# Patient Record
Sex: MALE | Employment: FULL TIME | URBAN - METROPOLITAN AREA
[De-identification: names, ages, dates, MRNs, and addresses within clinical notes are randomized per-mention and may not be internally consistent; named-entity substitution may affect disease eponyms.]

---

## 2018-08-20 ENCOUNTER — HOSPITAL ENCOUNTER (EMERGENCY)
Age: 63
Discharge: HOME | End: 2018-08-20
Attending: EMERGENCY MEDICINE
Payer: MEDICARE

## 2018-08-20 ENCOUNTER — APPOINTMENT (OUTPATIENT)
Dept: GENERAL RADIOLOGY | Age: 63
End: 2018-08-20
Attending: EMERGENCY MEDICINE
Payer: MEDICARE

## 2018-08-20 VITALS
RESPIRATION RATE: 15 BRPM | HEIGHT: 72 IN | WEIGHT: 140 LBS | DIASTOLIC BLOOD PRESSURE: 76 MMHG | HEART RATE: 72 BPM | SYSTOLIC BLOOD PRESSURE: 135 MMHG | BODY MASS INDEX: 18.96 KG/M2 | TEMPERATURE: 98.2 F | OXYGEN SATURATION: 99 %

## 2018-08-20 DIAGNOSIS — R00.2 PALPITATIONS: ICD-10-CM

## 2018-08-20 DIAGNOSIS — N18.5 STAGE 5 CHRONIC KIDNEY DISEASE NOT ON CHRONIC DIALYSIS (HCC): ICD-10-CM

## 2018-08-20 DIAGNOSIS — I47.1 PAROXYSMAL SVT (SUPRAVENTRICULAR TACHYCARDIA) (HCC): Primary | ICD-10-CM

## 2018-08-20 LAB
ALBUMIN SERPL-MCNC: 3.5 G/DL (ref 3.4–5)
ALBUMIN/GLOB SERPL: 0.8 {RATIO} (ref 0.8–1.7)
ALP SERPL-CCNC: 87 U/L (ref 45–117)
ALT SERPL-CCNC: 24 U/L (ref 16–61)
ANION GAP SERPL CALC-SCNC: 10 MMOL/L (ref 3–18)
AST SERPL-CCNC: 11 U/L (ref 15–37)
BASOPHILS # BLD: 0 K/UL (ref 0–0.1)
BASOPHILS NFR BLD: 1 % (ref 0–2)
BILIRUB SERPL-MCNC: 0.3 MG/DL (ref 0.2–1)
BUN SERPL-MCNC: 49 MG/DL (ref 7–18)
BUN/CREAT SERPL: 9 (ref 12–20)
CALCIUM SERPL-MCNC: 7.6 MG/DL (ref 8.5–10.1)
CHLORIDE SERPL-SCNC: 119 MMOL/L (ref 100–108)
CK MB CFR SERPL CALC: 1.9 % (ref 0–4)
CK MB SERPL-MCNC: 2.4 NG/ML (ref 5–25)
CK SERPL-CCNC: 124 U/L (ref 39–308)
CO2 SERPL-SCNC: 16 MMOL/L (ref 21–32)
CREAT SERPL-MCNC: 5.7 MG/DL (ref 0.6–1.3)
DIFFERENTIAL METHOD BLD: ABNORMAL
EOSINOPHIL # BLD: 0.2 K/UL (ref 0–0.4)
EOSINOPHIL NFR BLD: 4 % (ref 0–5)
ERYTHROCYTE [DISTWIDTH] IN BLOOD BY AUTOMATED COUNT: 12.9 % (ref 11.6–14.5)
GLOBULIN SER CALC-MCNC: 4.5 G/DL (ref 2–4)
GLUCOSE SERPL-MCNC: 120 MG/DL (ref 74–99)
HCT VFR BLD AUTO: 33.7 % (ref 36–48)
HGB BLD-MCNC: 11.6 G/DL (ref 13–16)
LYMPHOCYTES # BLD: 1.3 K/UL (ref 0.9–3.6)
LYMPHOCYTES NFR BLD: 30 % (ref 21–52)
MCH RBC QN AUTO: 28.7 PG (ref 24–34)
MCHC RBC AUTO-ENTMCNC: 34.4 G/DL (ref 31–37)
MCV RBC AUTO: 83.4 FL (ref 74–97)
MONOCYTES # BLD: 0.4 K/UL (ref 0.05–1.2)
MONOCYTES NFR BLD: 10 % (ref 3–10)
NEUTS SEG # BLD: 2.4 K/UL (ref 1.8–8)
NEUTS SEG NFR BLD: 55 % (ref 40–73)
PLATELET # BLD AUTO: 157 K/UL (ref 135–420)
PMV BLD AUTO: 9 FL (ref 9.2–11.8)
POTASSIUM SERPL-SCNC: 4.2 MMOL/L (ref 3.5–5.5)
PROT SERPL-MCNC: 8 G/DL (ref 6.4–8.2)
RBC # BLD AUTO: 4.04 M/UL (ref 4.7–5.5)
SODIUM SERPL-SCNC: 145 MMOL/L (ref 136–145)
TROPONIN I SERPL-MCNC: 0.05 NG/ML (ref 0–0.04)
WBC # BLD AUTO: 4.3 K/UL (ref 4.6–13.2)

## 2018-08-20 PROCEDURE — 93005 ELECTROCARDIOGRAM TRACING: CPT

## 2018-08-20 PROCEDURE — 85025 COMPLETE CBC W/AUTO DIFF WBC: CPT | Performed by: EMERGENCY MEDICINE

## 2018-08-20 PROCEDURE — 80053 COMPREHEN METABOLIC PANEL: CPT | Performed by: EMERGENCY MEDICINE

## 2018-08-20 PROCEDURE — 99285 EMERGENCY DEPT VISIT HI MDM: CPT

## 2018-08-20 PROCEDURE — 71045 X-RAY EXAM CHEST 1 VIEW: CPT

## 2018-08-20 PROCEDURE — 82550 ASSAY OF CK (CPK): CPT | Performed by: EMERGENCY MEDICINE

## 2018-08-20 RX ORDER — ADENOSINE 3 MG/ML
6 INJECTION, SOLUTION INTRAVENOUS
Status: DISCONTINUED | OUTPATIENT
Start: 2018-08-20 | End: 2018-08-21 | Stop reason: HOSPADM
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
## 2018-08-21 LAB
ATRIAL RATE: 23 BPM
ATRIAL RATE: 58 BPM
ATRIAL RATE: 78 BPM
CALCULATED P AXIS, ECG09: 66 DEGREES
CALCULATED R AXIS, ECG10: -42 DEGREES
CALCULATED R AXIS, ECG10: -46 DEGREES
CALCULATED R AXIS, ECG10: -55 DEGREES
CALCULATED T AXIS, ECG11: 113 DEGREES
CALCULATED T AXIS, ECG11: 90 DEGREES
CALCULATED T AXIS, ECG11: 91 DEGREES
DIAGNOSIS, 93000: NORMAL
P-R INTERVAL, ECG05: 206 MS
Q-T INTERVAL, ECG07: 340 MS
Q-T INTERVAL, ECG07: 344 MS
Q-T INTERVAL, ECG07: 414 MS
QRS DURATION, ECG06: 134 MS
QRS DURATION, ECG06: 136 MS
QRS DURATION, ECG06: 142 MS
QTC CALCULATION (BEZET), ECG08: 471 MS
QTC CALCULATION (BEZET), ECG08: 515 MS
QTC CALCULATION (BEZET), ECG08: 526 MS
VENTRICULAR RATE, ECG03: 138 BPM
VENTRICULAR RATE, ECG03: 141 BPM
VENTRICULAR RATE, ECG03: 78 BPM

## 2018-08-21 NOTE — DISCHARGE INSTRUCTIONS
Supraventricular Tachycardia: Care Instructions  Your Care Instructions    Having supraventricular tachycardia (SVT) means that from time to time your heart beats abnormally fast. This fast rhythm is caused by changes in the electrical system of your heart. You may feel a fluttering in your chest (palpitations) and have a fast pulse. When your heart is beating fast, you may feel anxious and lightheaded, be short of breath, and feel discomfort in the chest.  Your doctor may prescribe medicines to help slow down your heartbeat. Your doctor may also suggest you try vagal maneuvers when having an episode of SVT. These are things, like bearing down, that might help slow your heart rate. Bearing down means that you try to breathe out with your stomach muscles but you don't let air out of your nose or mouth. Your doctor can show you how to do vagal maneuvers. He or she may suggest you lie down on your back to do them. In some cases, either cardioversion treatment or a procedure called catheter ablation is done to correct SVT. Your doctor may ask you to wear a small electronic device for 1 or 2 days to monitor your heart. It is called a Holter monitor. Follow-up care is a key part of your treatment and safety. Be sure to make and go to all appointments, and call your doctor if you are having problems. It's also a good idea to know your test results and keep a list of the medicines you take. How can you care for yourself at home? · Be safe with medicines. Take your medicines exactly as prescribed. Call your doctor if you think you are having a problem with your medicine. You will get more details on the specific medicines your doctor prescribes. · If your doctor showed you how to do vagal maneuvers, try them when you have an episode. These maneuvers include bearing down or putting an ice-cold, wet towel on your face. · Monitor your condition by keeping a diary of your SVT episodes.  Bring this to your doctor appointments. ¨ Write down how fast or slow your heart was beating. To count your heart rate:  § Gently place 2 fingers of your hand on the inside of your other wrist, below your thumb. § Count the beats for 30 seconds. § Then, double the result to get the number of beats per minute. ¨ Write down if your heart rhythm was regular or irregular. ¨ Write down the symptoms you had. ¨ Write down the time of day your symptoms occurred. ¨ Write down how long your symptoms lasted. ¨ Write down what you were doing when your symptoms started. ¨ Write down what may have helped your symptoms go away. · If they trigger episodes, limit or avoid alcohol or drinks with caffeine. · Do not use over-the-counter decongestants, herbal remedies, diet pills, or \"pep\" pills, which often contain stimulants. · Do not use illegal drugs, such as cocaine, ecstasy, or methamphetamine, which can speed up your heart's rhythm. · Do not smoke. Smoking can make this condition worse. If you need help quitting, talk to your doctor about stop-smoking programs and medicines. These can increase your chances of quitting for good. · Be alert for new or worsening symptoms, such as shortness of breath, pounding of your heart, or unusual tiredness. If new symptoms develop or your symptoms become worse, call your doctor. When should you call for help? Call 911 anytime you think you may need emergency care. For example, call if:    · You passed out (lost consciousness).     · You are short of breath.    Call your doctor now or seek immediate medical care if:    · You have a fast heartbeat.     · You are dizzy or lightheaded, or feel like you may faint.    Watch closely for changes in your health, and be sure to contact your doctor if:    · You do not get better as expected. Where can you learn more? Go to http://karen-catrachito.info/.   Enter G244 in the search box to learn more about \"Supraventricular Tachycardia: Care Instructions. \"  Current as of: December 6, 2017  Content Version: 11.7  © 2629-3336 [employer]. Care instructions adapted under license by [employer] (which disclaims liability or warranty for this information). If you have questions about a medical condition or this instruction, always ask your healthcare professional. [address] any warranty or liability for your use of this information. Learning About Chronic Kidney Disease  What is chronic kidney disease? Chronic kidney disease means your kidneys have not worked right for a while. Your kidneys have an important job. They remove waste and extra fluid from your blood. This waste and fluid goes out of your body in your urine. When your kidneys don't work as they should, wastes build up in your blood. This makes you sick. High blood pressure and diabetes can cause kidney damage. Other causes include kidney infections and some medicines. Chronic kidney disease is also called chronic renal failure. Or it may be called chronic renal insufficiency. How is chronic kidney disease diagnosed? · Your doctor will do blood and urine tests to check your kidney function. This will help your doctor see how well your kidneys filter your blood. · Your doctor will ask you about past kidney problems. He or she will ask if you have a family history of kidney disease. Your doctor will also want to know what medicines you take. This includes prescription and over-the-counter medicines. · You may have a test, such as an ultrasound or CT scan. These tests let your doctor look at a picture of your kidneys. This can help your doctor measure the size of your kidneys and see if anything is blocking your urine flow. · In some cases, your doctor may take a tiny sample of kidney tissue. This is called a biopsy. It helps the doctor find out what caused the kidney disease. What are the symptoms?   You may not have symptoms if your disease is mild. If you lose more kidney function, you may:  · Have swelling and weight gain. This is from the extra fluid in your tissues. It is called edema (say \"ih-CHRISTOS-muh\"). · Often feel sick to your stomach (nauseated) or vomit. · Have trouble sleeping. · Urinate less than normal.  · Have trouble thinking clearly. · Feel very tired. What can you expect when you have chronic kidney disease? · In the early stages of the disease, your kidneys are still able to regulate the fluids, salts, and waste products in your body. But if you keep losing kidney function, you may start to have problems, or complications. · How long it takes for the kidney disease to get worse depends on your condition. Sometimes it gets worse very slowly over many years. Or it may get worse more quickly. · When kidney function falls below a certain point, it is called kidney failure. Kidney failure affects your whole body. It can cause serious heart, bone, and brain problems and make you feel very ill. Untreated kidney failure can cause death. How is it treated? Manage your health problems  · If you have diabetes, it's important to control your blood sugar level with diet, exercise, and medicines. If your blood sugar level is too high for too long, it can damage your kidneys. · If you have high blood pressure, it's important to control it with diet, exercise, and any medicines your doctor prescribes. · Avoid long-term use of medicines that can damage the kidneys. These medicines include nonsteroidal anti-inflammatory drugs. Examples of these are ibuprofen (Advil) and celecoxib (Celebrex). Treat kidney complications  · If your doctor put you on a special diet, stay on the diet. · If you have kidney failure, you'll probably have two treatment choices. Your doctor may recommend that you start kidney dialysis to filter wastes and extra fluid from your blood. Or it may be better to get a new kidney (transplant).  Both treatments have risks and benefits. Talk with your doctor to decide which is best for you. Practice healthy habits  · If your doctor recommends it, get more exercise. Walking is a good choice. Bit by bit, increase the amount you walk every day. Try for at least 30 minutes on most days of the week. · Don't smoke. Smoking can make chronic kidney disease worse. If you need help quitting, talk to your doctor about stop-smoking programs and medicines. These can increase your chances of quitting for good. · Don't drink alcohol. Follow-up care is a key part of your treatment and safety. Be sure to make and go to all appointments, and call your doctor if you are having problems. It's also a good idea to know your test results and keep a list of the medicines you take. Where can you learn more? Go to http://karen-catrachito.info/. Enter 0650 [id number] in the search box to learn more about \"Learning About Chronic Kidney Disease. \"  Current as of: May 12, 2017  Content Version: 11.7  © 4442-0134 [employer]. Care instructions adapted under license by [employer] (which disclaims liability or warranty for this information). If you have questions about a medical condition or this instruction, always ask your healthcare professional. [place] [address] any warranty or liability for your use of this information. Palpitations: Care Instructions  Your Care Instructions    Heart palpitations are the uncomfortable sensation that your heart is beating fast or irregularly. You might feel pounding or fluttering in your chest. It might feel like your heart is skipping a beat. Although palpitations may be caused by a heart problem, they also occur because of stress, fatigue, or use of alcohol, caffeine, or nicotine. Many medicines, including diet pills, antihistamines, decongestants, and some herbal products, can cause heart palpitations.  Nearly everyone has palpitations from time to time.  Depending on your symptoms, your doctor may need to do more tests to try to find the cause of your palpitations. Follow-up care is a key part of your treatment and safety. Be sure to make and go to all appointments, and call your doctor if you are having problems. It's also a good idea to know your test results and keep a list of the medicines you take. How can you care for yourself at home? · Avoid caffeine, nicotine, and excess alcohol. · Do not take illegal drugs, such as methamphetamines and cocaine. · Do not take weight loss or diet medicines unless you talk with your doctor first.  · Get plenty of sleep. · Do not overeat. · If you have palpitations again, take deep breaths and try to relax. This may slow a racing heart. · If you start to feel lightheaded, lie down to avoid injuries that might result if you pass out and fall down. · Keep a record of your palpitations and bring it to your next doctor's appointment. Write down:  ¨ The date and time. ¨ Your pulse. (If your heart is beating fast, it may be hard to count your pulse.)  ¨ What you were doing when the palpitations started. ¨ How long the palpitations lasted. ¨ Any other symptoms. · If an activity causes palpitations, slow down or stop. Talk to your doctor before you do that activity again. · Take your medicines exactly as prescribed. Call your doctor if you think you are having a problem with your medicine. When should you call for help? Call 911 anytime you think you may need emergency care. For example, call if:    · You passed out (lost consciousness).     · You have symptoms of a heart attack. These may include:  ¨ Chest pain or pressure, or a strange feeling in the chest.  ¨ Sweating. ¨ Shortness of breath. ¨ Pain, pressure, or a strange feeling in the back, neck, jaw, or upper belly or in one or both shoulders or arms. ¨ Lightheadedness or sudden weakness. ¨ A fast or irregular heartbeat.   After you call 911, the  may tell you to chew 1 adult-strength or 2 to 4 low-dose aspirin. Wait for an ambulance. Do not try to drive yourself.     · You have symptoms of a stroke. These may include:  ¨ Sudden numbness, tingling, weakness, or loss of movement in your face, arm, or leg, especially on only one side of your body. ¨ Sudden vision changes. ¨ Sudden trouble speaking. ¨ Sudden confusion or trouble understanding simple statements. ¨ Sudden problems with walking or balance. ¨ A sudden, severe headache that is different from past headaches.    Call your doctor now or seek immediate medical care if:    · You have heart palpitations and:  ¨ Are dizzy or lightheaded, or you feel like you may faint. ¨ Have new or increased shortness of breath.    Watch closely for changes in your health, and be sure to contact your doctor if:    · You continue to have heart palpitations. Where can you learn more? Go to http://karen-catrachito.info/. Enter R508 in the search box to learn more about \"Palpitations: Care Instructions. \"  Current as of: December 6, 2017  Content Version: 11.7  © 0501-5350 [employer]. Care instructions adapted under license by [employer] (which disclaims liability or warranty for this information). If you have questions about a medical condition or this instruction, always ask your healthcare professional. [place] [address] any warranty or liability for your use of this information.

## 2018-10-20 ENCOUNTER — HOSPITAL ENCOUNTER (EMERGENCY)
Age: 63
Discharge: HOME | End: 2018-10-21
Attending: EMERGENCY MEDICINE | Admitting: EMERGENCY MEDICINE
Payer: MEDICARE

## 2018-10-20 ENCOUNTER — APPOINTMENT (OUTPATIENT)
Dept: GENERAL RADIOLOGY | Age: 63
End: 2018-10-20
Attending: EMERGENCY MEDICINE
Payer: MEDICARE

## 2018-10-20 DIAGNOSIS — R07.9 CHEST PAIN, UNSPECIFIED TYPE: Primary | ICD-10-CM

## 2018-10-20 LAB
ANION GAP SERPL CALC-SCNC: 12 MMOL/L (ref 3–18)
BASOPHILS # BLD: 0 K/UL (ref 0–0.1)
BASOPHILS NFR BLD: 0 % (ref 0–2)
BUN SERPL-MCNC: 52 MG/DL (ref 7–18)
BUN/CREAT SERPL: 8 (ref 12–20)
CALCIUM SERPL-MCNC: 7.2 MG/DL (ref 8.5–10.1)
CHLORIDE SERPL-SCNC: 110 MMOL/L (ref 100–108)
CK MB CFR SERPL CALC: 1.5 % (ref 0–4)
CK MB SERPL-MCNC: 1.2 NG/ML (ref 5–25)
CK SERPL-CCNC: 81 U/L (ref 39–308)
CO2 SERPL-SCNC: 20 MMOL/L (ref 21–32)
CREAT SERPL-MCNC: 6.43 MG/DL (ref 0.6–1.3)
DIFFERENTIAL METHOD BLD: ABNORMAL
EOSINOPHIL # BLD: 0.2 K/UL (ref 0–0.4)
EOSINOPHIL NFR BLD: 5 % (ref 0–5)
ERYTHROCYTE [DISTWIDTH] IN BLOOD BY AUTOMATED COUNT: 12.3 % (ref 11.6–14.5)
GLUCOSE SERPL-MCNC: 146 MG/DL (ref 74–99)
HCT VFR BLD AUTO: 30.1 % (ref 36–48)
HGB BLD-MCNC: 10.2 G/DL (ref 13–16)
LYMPHOCYTES # BLD: 1 K/UL (ref 0.9–3.6)
LYMPHOCYTES NFR BLD: 30 % (ref 21–52)
MCH RBC QN AUTO: 28.3 PG (ref 24–34)
MCHC RBC AUTO-ENTMCNC: 33.9 G/DL (ref 31–37)
MCV RBC AUTO: 83.6 FL (ref 74–97)
MONOCYTES # BLD: 0.2 K/UL (ref 0.05–1.2)
MONOCYTES NFR BLD: 6 % (ref 3–10)
NEUTS SEG # BLD: 2 K/UL (ref 1.8–8)
NEUTS SEG NFR BLD: 59 % (ref 40–73)
PLATELET # BLD AUTO: 153 K/UL (ref 135–420)
PMV BLD AUTO: 8.9 FL (ref 9.2–11.8)
POTASSIUM SERPL-SCNC: 3.6 MMOL/L (ref 3.5–5.5)
RBC # BLD AUTO: 3.6 M/UL (ref 4.7–5.5)
SODIUM SERPL-SCNC: 142 MMOL/L (ref 136–145)
TROPONIN I SERPL-MCNC: 0.06 NG/ML (ref 0–0.04)
WBC # BLD AUTO: 3.3 K/UL (ref 4.6–13.2)

## 2018-10-20 PROCEDURE — 74011000250 HC RX REV CODE- 250: Performed by: EMERGENCY MEDICINE

## 2018-10-20 PROCEDURE — 82550 ASSAY OF CK (CPK): CPT | Performed by: EMERGENCY MEDICINE

## 2018-10-20 PROCEDURE — 74011250637 HC RX REV CODE- 250/637: Performed by: EMERGENCY MEDICINE

## 2018-10-20 PROCEDURE — 80048 BASIC METABOLIC PNL TOTAL CA: CPT | Performed by: EMERGENCY MEDICINE

## 2018-10-20 PROCEDURE — 85025 COMPLETE CBC W/AUTO DIFF WBC: CPT | Performed by: EMERGENCY MEDICINE

## 2018-10-20 PROCEDURE — 99283 EMERGENCY DEPT VISIT LOW MDM: CPT

## 2018-10-20 PROCEDURE — 93005 ELECTROCARDIOGRAM TRACING: CPT

## 2018-10-20 PROCEDURE — 71045 X-RAY EXAM CHEST 1 VIEW: CPT

## 2018-10-20 RX ORDER — FAMOTIDINE 20 MG/1
20 TABLET, FILM COATED ORAL
Status: COMPLETED | OUTPATIENT
Start: 2018-10-20 | End: 2018-10-20

## 2018-10-20 RX ORDER — LIDOCAINE HYDROCHLORIDE 20 MG/ML
15 SOLUTION OROPHARYNGEAL
Status: DISCONTINUED | OUTPATIENT
Start: 2018-10-20 | End: 2018-10-21 | Stop reason: HOSPADM

## 2018-10-20 RX ADMIN — ALUMINUM HYDROXIDE AND MAGNESIUM HYDROXIDE 15 ML: 200; 200 SUSPENSION ORAL at 22:34

## 2018-10-20 RX ADMIN — LIDOCAINE HYDROCHLORIDE 15 ML: 20 SOLUTION ORAL; TOPICAL at 22:34

## 2018-10-20 RX ADMIN — FAMOTIDINE 20 MG: 20 TABLET ORAL at 22:34

## 2018-10-21 VITALS
SYSTOLIC BLOOD PRESSURE: 114 MMHG | RESPIRATION RATE: 17 BRPM | HEART RATE: 69 BPM | OXYGEN SATURATION: 98 % | DIASTOLIC BLOOD PRESSURE: 65 MMHG

## 2018-10-21 LAB
ATRIAL RATE: 81 BPM
CALCULATED P AXIS, ECG09: 75 DEGREES
CALCULATED R AXIS, ECG10: -57 DEGREES
CALCULATED T AXIS, ECG11: 125 DEGREES
DIAGNOSIS, 93000: NORMAL
P-R INTERVAL, ECG05: 168 MS
Q-T INTERVAL, ECG07: 434 MS
QRS DURATION, ECG06: 148 MS
QTC CALCULATION (BEZET), ECG08: 504 MS
TROPONIN I SERPL-MCNC: 0.07 NG/ML (ref 0–0.04)
VENTRICULAR RATE, ECG03: 81 BPM

## 2018-10-21 PROCEDURE — 84484 ASSAY OF TROPONIN QUANT: CPT | Performed by: EMERGENCY MEDICINE

## 2018-10-21 NOTE — DISCHARGE INSTRUCTIONS

## 2020-01-01 NOTE — ED PROVIDER NOTES
Dr Guzman notified by Dr George of echo findings. Orders received to transfer infant to NICU   EMERGENCY DEPARTMENT HISTORY AND PHYSICAL EXAM 
 
10:01 PM 
 
 
Date: 10/20/2018 Patient Name: Natan Messina History of Presenting Illness No chief complaint on file. History Provided By: Patient Chief Complaint: Chest pain Duration:35  Minutes Timing:  Acute Location: middle of chest  
Quality: Dull Severity: Moderate Modifying Factors: None Associated Symptoms: None Additional History (Context): Natan Messina is a 70 y.o. male with PMHx of HTN and sickle cell anemia who presents with c/o moderate acute onset middle of chest, dull chest pain that started 35 minutes ago. Pt states he had this pain before twice, however he states he think this chest pain is gas because he ate rib, corn on the sarah, and cereal with milk before the chest pain started. Pt was discharged from the hospital in [place] yesterday because he had fluid in the lungs and chest and he was admitted. Because of this the patient was advised to come to the ED if he had any chest pain. Pt is currently on blood thinners, Warfarin. Pt denies SOB, nausea, and lightheadedness. Denies any further complaints or symptoms at the moment. PCP: None Past History Past Medical History: 
Past Medical History:  
Diagnosis Date  Hypertension  Sickle cell anemia (HCC) Past Surgical History: 
Past Surgical History:  
Procedure Laterality Date  HX ORTHOPAEDIC Family History: No family history on file. Social History: 
Social History Tobacco Use  Smoking status: Current Every Day Smoker Substance Use Topics  Alcohol use: No  
 Drug use: Yes Types: Marijuana Allergies: Allergies Allergen Reactions  Aspirin Other (comments)  Quinine Other (comments)  Sulfa (Sulfonamide Antibiotics) Other (comments)  Tylenol [Acetaminophen] Not Reported This Time Review of Systems Review of Systems Constitutional: Negative for activity change and appetite change. HENT: Negative for congestion. Eyes: Negative for visual disturbance. Respiratory: Negative for cough and shortness of breath. Cardiovascular: Positive for chest pain. Gastrointestinal: Negative for abdominal pain, diarrhea, nausea and vomiting. Genitourinary: Negative for dysuria. Musculoskeletal: Negative for arthralgias and myalgias. Skin: Negative for rash. Neurological: Negative for weakness, light-headedness and numbness. Physical Exam  
 
Visit Vitals /65 Pulse 69 Resp 17 SpO2 98% Physical Exam  
Constitutional: He is oriented to person, place, and time. He appears well-developed and well-nourished. HENT:  
Head: Normocephalic and atraumatic. Mouth/Throat: Oropharynx is clear and moist.  
Eyes: Conjunctivae are normal.  
Neck: Normal range of motion. Neck supple. No JVD present. Cardiovascular: Normal rate, regular rhythm, normal heart sounds and intact distal pulses. No murmur heard. Pulmonary/Chest: Effort normal and breath sounds normal.  
Abdominal: Soft. Bowel sounds are normal. He exhibits no distension. There is no tenderness. Musculoskeletal: Normal range of motion. He exhibits no deformity. Lymphadenopathy:  
  He has no cervical adenopathy. Neurological: He is alert and oriented to person, place, and time. Coordination normal.  
Skin: Skin is warm and dry. No rash noted. Psychiatric: He has a normal mood and affect. Nursing note and vitals reviewed. Diagnostic Study Results Labs - Recent Results (from the past 12 hour(s)) EKG, 12 LEAD, INITIAL Collection Time: 10/20/18  9:28 PM  
Result Value Ref Range Ventricular Rate 81 BPM  
 Atrial Rate 81 BPM  
 P-R Interval 168 ms QRS Duration 148 ms Q-T Interval 434 ms QTC Calculation (Bezet) 504 ms Calculated P Axis 75 degrees Calculated R Axis -57 degrees Calculated T Axis 125 degrees Diagnosis Normal sinus rhythm Possible Left atrial enlargement Left axis deviation Left bundle branch block Abnormal ECG When compared with ECG of 20-AUG-2018 20:17, 
premature ventricular complexes are no longer present CBC WITH AUTOMATED DIFF Collection Time: 10/20/18  9:57 PM  
Result Value Ref Range WBC 3.3 (L) 4.6 - 13.2 K/uL  
 RBC 3.60 (L) 4.70 - 5.50 M/uL  
 HGB 10.2 (L) 13.0 - 16.0 g/dL HCT 30.1 (L) 36.0 - 48.0 % MCV 83.6 74.0 - 97.0 FL  
 MCH 28.3 24.0 - 34.0 PG  
 MCHC 33.9 31.0 - 37.0 g/dL  
 RDW 12.3 11.6 - 14.5 % PLATELET 790 242 - 820 K/uL MPV 8.9 (L) 9.2 - 11.8 FL  
 NEUTROPHILS 59 40 - 73 % LYMPHOCYTES 30 21 - 52 % MONOCYTES 6 3 - 10 % EOSINOPHILS 5 0 - 5 % BASOPHILS 0 0 - 2 %  
 ABS. NEUTROPHILS 2.0 1.8 - 8.0 K/UL  
 ABS. LYMPHOCYTES 1.0 0.9 - 3.6 K/UL  
 ABS. MONOCYTES 0.2 0.05 - 1.2 K/UL  
 ABS. EOSINOPHILS 0.2 0.0 - 0.4 K/UL  
 ABS. BASOPHILS 0.0 0.0 - 0.1 K/UL  
 DF AUTOMATED METABOLIC PANEL, BASIC Collection Time: 10/20/18  9:57 PM  
Result Value Ref Range Sodium 142 136 - 145 mmol/L Potassium 3.6 3.5 - 5.5 mmol/L Chloride 110 (H) 100 - 108 mmol/L  
 CO2 20 (L) 21 - 32 mmol/L Anion gap 12 3.0 - 18 mmol/L Glucose 146 (H) 74 - 99 mg/dL BUN 52 (H) 7.0 - 18 MG/DL Creatinine 6.43 (H) 0.6 - 1.3 MG/DL  
 BUN/Creatinine ratio 8 (L) 12 - 20 GFR est AA 11 (L) >60 ml/min/1.73m2 GFR est non-AA 9 (L) >60 ml/min/1.73m2 Calcium 7.2 (L) 8.5 - 10.1 MG/DL  
CARDIAC PANEL,(CK, CKMB & TROPONIN) Collection Time: 10/20/18  9:57 PM  
Result Value Ref Range CK 81 39 - 308 U/L  
 CK - MB 1.2 <3.6 ng/ml CK-MB Index 1.5 0.0 - 4.0 % Troponin-I, Qt. 0.06 (H) 0.0 - 0.045 NG/ML  
TROPONIN I Collection Time: 10/21/18 12:39 AM  
Result Value Ref Range Troponin-I, Qt. 0.07 (H) 0.0 - 0.045 NG/ML Radiologic Studies -  
XR CHEST SNGL V Final Result Xr Chest Sngl V Result Date: 10/21/2018 Impression: 1. No definite acute process. Medical Decision Making I am the first provider for this patient. I reviewed the vital signs, available nursing notes, past medical history, past surgical history, family history and social history. Vital Signs-Reviewed the patient's vital signs. Pulse Oximetry Analysis -  98% on room air (Interpretation) normal 
 
EKG: Interpreted by the EP. Normal sinus rhythm, rate 81, , QTc 504. T-wave inversion in V5, V6, no STEMI. Similar to previous ecg from august 2018. Records Reviewed: Nursing Notes (Time of Review: 10:01 PM) 
 
ED Course: Progress Notes, Reevaluation, and Consults: 
 
Provider Notes (Medical Decision Making): Sachin Lee is a 70 y.o. male with PMHx of HTN and sickle cell anemia who presents with c/o moderate acute onset middle of chest, dull chest pain that started 35 minutes ago. Pt states he had this pain before twice, however he states he think this chest pain is gas because he ate rib, corn on the sarah, and cereal with milk before the chest pain started. Appears well. Recent admission for PE, now on coumadin, EF 20%. Differential Diagnosis: Will consider ACS, GERD, Pericarditis/myocarditis, pleuritis, bronchitis or other respiratory infection, pneumothorax, pneumonia, MSK pain. Given the patient's history and exam, I do not suspect PE, aortic dissection, pancreatitis, esophageal rupture, pericardial tamponate, mediastenitis. Testing: CXR, ECG, CBC, CMP, Troponin. Treatments: famotidine, gi cocktail First and second troponin negative. Symptoms resolved with GI cocktail. Discussed admission vs discharge given his heart history. Patient would like to be discharged, in shared decision making conversation. Recommended prompt follow-up with his cardiologist. The patient will be discharged home. Findings were discussed at length and questions were answered. Information on all newly prescribed medications was given. the patient was instructed to follow-up with his cardiologist, or return to the Emergency Department with any worsened symptoms or concerns. Return precautions were given. Diagnosis Clinical Impression: 1. Chest pain, unspecified type Disposition: discharged Follow-up Information Follow up With Specialties Details Why Contact Info Your Cardiologist   Schedule an appointment as soon as possible for a visit For follow-up [facility] EMERGENCY DEPT Emergency Medicine Go to As needed, If symptoms worsen [address] [id number] 
[phone] Medication List  
  
You have not been prescribed any medications. _______________________________ Attestations: 
Scribe Attestation Lina Crow acting as a scribe for and in the presence of Flor Arriaga MD     
October 20, 2018 at 10:01 PM 
    
Provider Attestation:     
I personally performed the services described in the documentation, reviewed the documentation, as recorded by the scribe in my presence, and it accurately and completely records my words and actions. October 20, 2018 at 10:01 PM - Flor Arriaga MD   
_______________________________

## 2020-02-03 ENCOUNTER — APPOINTMENT (OUTPATIENT)
Dept: CT IMAGING | Age: 65
DRG: 101 | End: 2020-02-03
Attending: EMERGENCY MEDICINE
Payer: MEDICARE

## 2020-02-03 ENCOUNTER — APPOINTMENT (OUTPATIENT)
Dept: GENERAL RADIOLOGY | Age: 65
DRG: 101 | End: 2020-02-03
Attending: EMERGENCY MEDICINE
Payer: MEDICARE

## 2020-02-03 ENCOUNTER — HOSPITAL ENCOUNTER (INPATIENT)
Age: 65
LOS: 1 days | Discharge: HOME | DRG: 101 | End: 2020-02-05
Attending: EMERGENCY MEDICINE | Admitting: EMERGENCY MEDICINE
Payer: MEDICARE

## 2020-02-03 DIAGNOSIS — R56.9 SEIZURE (HCC): Primary | ICD-10-CM

## 2020-02-03 DIAGNOSIS — E16.2 HYPOGLYCEMIA: ICD-10-CM

## 2020-02-03 DIAGNOSIS — N18.9 CHRONIC KIDNEY DISEASE, UNSPECIFIED CKD STAGE: ICD-10-CM

## 2020-02-03 DIAGNOSIS — E87.5 ACUTE HYPERKALEMIA: ICD-10-CM

## 2020-02-03 LAB
ALBUMIN SERPL-MCNC: 3.7 G/DL (ref 3.4–5)
ALBUMIN/GLOB SERPL: 0.9 {RATIO} (ref 0.8–1.7)
ALP SERPL-CCNC: 100 U/L (ref 45–117)
ALT SERPL-CCNC: 40 U/L (ref 16–61)
ANION GAP SERPL CALC-SCNC: 12 MMOL/L (ref 3–18)
APTT PPP: 33.2 SEC (ref 23–36.4)
AST SERPL-CCNC: 76 U/L (ref 10–38)
BASOPHILS # BLD: 0 K/UL (ref 0–0.1)
BASOPHILS NFR BLD: 0 % (ref 0–2)
BILIRUB SERPL-MCNC: 0.6 MG/DL (ref 0.2–1)
BNP SERPL-MCNC: 4787 PG/ML (ref 0–900)
BUN SERPL-MCNC: 60 MG/DL (ref 7–18)
BUN/CREAT SERPL: 10 (ref 12–20)
CALCIUM SERPL-MCNC: 7.7 MG/DL (ref 8.5–10.1)
CHLORIDE SERPL-SCNC: 116 MMOL/L (ref 100–111)
CO2 SERPL-SCNC: 13 MMOL/L (ref 21–32)
CREAT SERPL-MCNC: 5.95 MG/DL (ref 0.6–1.3)
DIFFERENTIAL METHOD BLD: ABNORMAL
EOSINOPHIL # BLD: 0 K/UL (ref 0–0.4)
EOSINOPHIL NFR BLD: 0 % (ref 0–5)
ERYTHROCYTE [DISTWIDTH] IN BLOOD BY AUTOMATED COUNT: 12.5 % (ref 11.6–14.5)
GLOBULIN SER CALC-MCNC: 4.1 G/DL (ref 2–4)
GLUCOSE BLD STRIP.AUTO-MCNC: 74 MG/DL (ref 70–110)
GLUCOSE BLD STRIP.AUTO-MCNC: 84 MG/DL (ref 70–110)
GLUCOSE SERPL-MCNC: 48 MG/DL (ref 74–99)
HCT VFR BLD AUTO: 31.2 % (ref 36–48)
HGB BLD-MCNC: 10.3 G/DL (ref 13–16)
INR PPP: 1 (ref 0.8–1.2)
LYMPHOCYTES # BLD: 0.7 K/UL (ref 0.9–3.6)
LYMPHOCYTES NFR BLD: 11 % (ref 21–52)
MAGNESIUM SERPL-MCNC: 1.9 MG/DL (ref 1.6–2.6)
MCH RBC QN AUTO: 27.5 PG (ref 24–34)
MCHC RBC AUTO-ENTMCNC: 33 G/DL (ref 31–37)
MCV RBC AUTO: 83.2 FL (ref 74–97)
MONOCYTES # BLD: 0.1 K/UL (ref 0.05–1.2)
MONOCYTES NFR BLD: 1 % (ref 3–10)
NEUTS SEG # BLD: 5.5 K/UL (ref 1.8–8)
NEUTS SEG NFR BLD: 88 % (ref 40–73)
PLATELET # BLD AUTO: 165 K/UL (ref 135–420)
PMV BLD AUTO: 9.6 FL (ref 9.2–11.8)
POTASSIUM SERPL-SCNC: 5.8 MMOL/L (ref 3.5–5.5)
PROT SERPL-MCNC: 7.8 G/DL (ref 6.4–8.2)
PROTHROMBIN TIME: 13.4 SEC (ref 11.5–15.2)
RBC # BLD AUTO: 3.75 M/UL (ref 4.7–5.5)
SODIUM SERPL-SCNC: 141 MMOL/L (ref 136–145)
TROPONIN I SERPL-MCNC: 0.14 NG/ML (ref 0–0.04)
WBC # BLD AUTO: 6.3 K/UL (ref 4.6–13.2)

## 2020-02-03 PROCEDURE — 99285 EMERGENCY DEPT VISIT HI MDM: CPT

## 2020-02-03 PROCEDURE — 71045 X-RAY EXAM CHEST 1 VIEW: CPT

## 2020-02-03 PROCEDURE — 83880 ASSAY OF NATRIURETIC PEPTIDE: CPT

## 2020-02-03 PROCEDURE — 96374 THER/PROPH/DIAG INJ IV PUSH: CPT

## 2020-02-03 PROCEDURE — 85730 THROMBOPLASTIN TIME PARTIAL: CPT

## 2020-02-03 PROCEDURE — 80053 COMPREHEN METABOLIC PANEL: CPT

## 2020-02-03 PROCEDURE — 82962 GLUCOSE BLOOD TEST: CPT

## 2020-02-03 PROCEDURE — 85025 COMPLETE CBC W/AUTO DIFF WBC: CPT

## 2020-02-03 PROCEDURE — 83735 ASSAY OF MAGNESIUM: CPT

## 2020-02-03 PROCEDURE — 74011250636 HC RX REV CODE- 250/636: Performed by: STUDENT IN AN ORGANIZED HEALTH CARE EDUCATION/TRAINING PROGRAM

## 2020-02-03 PROCEDURE — 84484 ASSAY OF TROPONIN QUANT: CPT

## 2020-02-03 PROCEDURE — 93005 ELECTROCARDIOGRAM TRACING: CPT

## 2020-02-03 PROCEDURE — 85610 PROTHROMBIN TIME: CPT

## 2020-02-03 PROCEDURE — 70450 CT HEAD/BRAIN W/O DYE: CPT

## 2020-02-03 RX ORDER — CALCIUM GLUCONATE 94 MG/ML
1 INJECTION, SOLUTION INTRAVENOUS ONCE
Status: COMPLETED | OUTPATIENT
Start: 2020-02-03 | End: 2020-02-03

## 2020-02-03 RX ADMIN — SODIUM CHLORIDE 500 ML: 900 INJECTION, SOLUTION INTRAVENOUS at 21:45

## 2020-02-03 RX ADMIN — CALCIUM GLUCONATE 1 G: 98 INJECTION, SOLUTION INTRAVENOUS at 22:27

## 2020-02-03 NOTE — ED NOTES
I performed a brief evaluation, including history and physical, of the patient here in triage and I have determined that pt will need further treatment and evaluation from the main side ER physician a bed is available. I have placed initial orders to help in expediting patients care.      February 03, 2020 at 6:10 PM - Purnima Mercado MD

## 2020-02-04 ENCOUNTER — APPOINTMENT (OUTPATIENT)
Dept: NON INVASIVE DIAGNOSTICS | Age: 65
DRG: 101 | End: 2020-02-04
Attending: EMERGENCY MEDICINE
Payer: MEDICARE

## 2020-02-04 PROBLEM — R56.9 SEIZURE (HCC): Status: ACTIVE | Noted: 2020-02-04

## 2020-02-04 PROBLEM — E87.5 HYPERKALEMIA: Status: ACTIVE | Noted: 2020-02-04

## 2020-02-04 PROBLEM — N18.9 CKD (CHRONIC KIDNEY DISEASE): Status: ACTIVE | Noted: 2020-02-04

## 2020-02-04 PROBLEM — R56.9 SEIZURE-LIKE ACTIVITY (HCC): Status: ACTIVE | Noted: 2020-02-04

## 2020-02-04 LAB
AMORPH CRY URNS QL MICRO: ABNORMAL
ANION GAP SERPL CALC-SCNC: 10 MMOL/L (ref 3–18)
ANION GAP SERPL CALC-SCNC: 8 MMOL/L (ref 3–18)
APPEARANCE UR: CLEAR
ATRIAL RATE: 67 BPM
AV PEAK GRADIENT: 92.72 MMHG
BACTERIA URNS QL MICRO: NEGATIVE /HPF
BILIRUB UR QL: NEGATIVE
BUN SERPL-MCNC: 66 MG/DL (ref 7–18)
BUN SERPL-MCNC: 67 MG/DL (ref 7–18)
BUN/CREAT SERPL: 11 (ref 12–20)
BUN/CREAT SERPL: 12 (ref 12–20)
CALCIUM SERPL-MCNC: 7.9 MG/DL (ref 8.5–10.1)
CALCIUM SERPL-MCNC: 8.2 MG/DL (ref 8.5–10.1)
CALCIUM SERPL-MCNC: 8.3 MG/DL (ref 8.5–10.1)
CALCULATED P AXIS, ECG09: 81 DEGREES
CALCULATED R AXIS, ECG10: -86 DEGREES
CALCULATED T AXIS, ECG11: 85 DEGREES
CHLORIDE SERPL-SCNC: 117 MMOL/L (ref 100–111)
CHLORIDE SERPL-SCNC: 117 MMOL/L (ref 100–111)
CHOLEST SERPL-MCNC: 111 MG/DL
CK MB CFR SERPL CALC: 0.9 % (ref 0–4)
CK MB CFR SERPL CALC: 1 % (ref 0–4)
CK MB SERPL-MCNC: 5.4 NG/ML (ref 5–25)
CK MB SERPL-MCNC: 6.1 NG/ML (ref 5–25)
CK SERPL-CCNC: 594 U/L (ref 39–308)
CK SERPL-CCNC: 622 U/L (ref 39–308)
CO2 SERPL-SCNC: 15 MMOL/L (ref 21–32)
CO2 SERPL-SCNC: 18 MMOL/L (ref 21–32)
COLOR UR: YELLOW
CREAT SERPL-MCNC: 5.81 MG/DL (ref 0.6–1.3)
CREAT SERPL-MCNC: 5.89 MG/DL (ref 0.6–1.3)
DIAGNOSIS, 93000: NORMAL
ECHO AO ROOT DIAM: 3.26 CM
ECHO AV REGURGITANT PHT: 538.2 CM
ECHO IVC SNIFF: 2.17 CM
ECHO LA AREA 4C: 25 CM2
ECHO LA VOL 2C: 94.19 ML (ref 18–58)
ECHO LA VOL 4C: 72.42 ML (ref 18–58)
ECHO LA VOL BP: 91.1 ML (ref 18–58)
ECHO LA VOL/BSA BIPLANE: 50.07 ML/M2 (ref 16–28)
ECHO LA VOLUME INDEX A2C: 51.77 ML/M2 (ref 16–28)
ECHO LA VOLUME INDEX A4C: 39.8 ML/M2 (ref 16–28)
ECHO LV E' LATERAL VELOCITY: 4.51 CM/S
ECHO LV E' SEPTAL VELOCITY: 4.42 CM/S
ECHO LV EDV TEICHHOLZ: 0.72 ML
ECHO LV ESV TEICHHOLZ: 0.48 ML
ECHO LV GLOBAL LONGITUDINAL STRAIN (GLS): -14.8 %
ECHO LV INTERNAL DIMENSION DIASTOLIC: 5.22 CM (ref 4.2–5.9)
ECHO LV INTERNAL DIMENSION SYSTOLIC: 4.39 CM
ECHO LV IVSD: 1.14 CM (ref 0.6–1)
ECHO LV MASS 2D: 277.7 G (ref 88–224)
ECHO LV MASS INDEX 2D: 152.6 G/M2 (ref 49–115)
ECHO LV POSTERIOR WALL DIASTOLIC: 1.15 CM (ref 0.6–1)
ECHO LVOT DIAM: 2.13 CM
ECHO LVOT PEAK GRADIENT: 5.5 MMHG
ECHO LVOT PEAK VELOCITY: 116.88 CM/S
ECHO LVOT VTI: 25.56 CM
ECHO MV A VELOCITY: 113.51 CM/S
ECHO MV E DECELERATION TIME (DT): 342.9 MS
ECHO MV E VELOCITY: 73.75 CM/S
ECHO MV E/A RATIO: 0.65
ECHO MV E/E' LATERAL: 16.35
ECHO MV E/E' RATIO (AVERAGED): 16.52
ECHO MV E/E' SEPTAL: 16.69
ECHO RV TAPSE: 2.86 CM (ref 1.5–2)
ECHO TV REGURGITANT MAX VELOCITY: 284.16 CM/S
ECHO TV REGURGITANT PEAK GRADIENT: 32.3 MMHG
EPITH CASTS URNS QL MICRO: ABNORMAL /LPF (ref 0–5)
FERRITIN SERPL-MCNC: 54 NG/ML (ref 8–388)
GLUCOSE BLD STRIP.AUTO-MCNC: 105 MG/DL (ref 70–110)
GLUCOSE BLD STRIP.AUTO-MCNC: 132 MG/DL (ref 70–110)
GLUCOSE BLD STRIP.AUTO-MCNC: 66 MG/DL (ref 70–110)
GLUCOSE BLD STRIP.AUTO-MCNC: 76 MG/DL (ref 70–110)
GLUCOSE SERPL-MCNC: 110 MG/DL (ref 74–99)
GLUCOSE SERPL-MCNC: 79 MG/DL (ref 74–99)
GLUCOSE UR STRIP.AUTO-MCNC: NEGATIVE MG/DL
HBA1C MFR BLD: <3.5 % (ref 4.2–5.6)
HBV SURFACE AG SER QL: <0.1 INDEX
HBV SURFACE AG SER QL: NEGATIVE
HDLC SERPL-MCNC: 65 MG/DL (ref 40–60)
HDLC SERPL: 1.7 {RATIO} (ref 0–5)
HGB UR QL STRIP: ABNORMAL
IRON SATN MFR SERPL: 83 % (ref 20–50)
IRON SERPL-MCNC: 202 UG/DL (ref 50–175)
KETONES UR QL STRIP.AUTO: NEGATIVE MG/DL
LDLC SERPL CALC-MCNC: 18.4 MG/DL (ref 0–100)
LEUKOCYTE ESTERASE UR QL STRIP.AUTO: NEGATIVE
LIPID PROFILE,FLP: ABNORMAL
LVFS 2D: 15.94 %
LVOT MG: 2.49 MMHG
LVOT MV: 0.73 CM/S
LVSV (MOD BI): 45.3 ML
LVSV (MOD SINGLE 4C): 39.53 ML
LVSV (MOD SINGLE): 47.06 ML
LVSV (TEICH): 24.45 ML
MV DEC SLOPE: 2.15
NITRITE UR QL STRIP.AUTO: NEGATIVE
P-R INTERVAL, ECG05: 112 MS
PH UR STRIP: 5 [PH] (ref 5–8)
PISA AR MAX VEL: 481.47 CM/S
POTASSIUM SERPL-SCNC: 5 MMOL/L (ref 3.5–5.5)
POTASSIUM SERPL-SCNC: 5.6 MMOL/L (ref 3.5–5.5)
PROT UR STRIP-MCNC: 30 MG/DL
PTH-INTACT SERPL-MCNC: 386.2 PG/ML (ref 18.4–88)
Q-T INTERVAL, ECG07: 480 MS
QRS DURATION, ECG06: 148 MS
QTC CALCULATION (BEZET), ECG08: 507 MS
RBC #/AREA URNS HPF: ABNORMAL /HPF (ref 0–5)
SODIUM SERPL-SCNC: 142 MMOL/L (ref 136–145)
SODIUM SERPL-SCNC: 143 MMOL/L (ref 136–145)
SP GR UR REFRACTOMETRY: 1.01 (ref 1–1.03)
TIBC SERPL-MCNC: 243 UG/DL (ref 250–450)
TRIGL SERPL-MCNC: 138 MG/DL (ref ?–150)
TROPONIN I SERPL-MCNC: 0.17 NG/ML (ref 0–0.04)
TROPONIN I SERPL-MCNC: 0.19 NG/ML (ref 0–0.04)
UROBILINOGEN UR QL STRIP.AUTO: 0.2 EU/DL (ref 0.2–1)
VENTRICULAR RATE, ECG03: 67 BPM
VLDLC SERPL CALC-MCNC: 27.6 MG/DL
WBC URNS QL MICRO: ABNORMAL /HPF (ref 0–4)

## 2020-02-04 PROCEDURE — 82550 ASSAY OF CK (CPK): CPT

## 2020-02-04 PROCEDURE — 83970 ASSAY OF PARATHORMONE: CPT

## 2020-02-04 PROCEDURE — 74011250636 HC RX REV CODE- 250/636: Performed by: STUDENT IN AN ORGANIZED HEALTH CARE EDUCATION/TRAINING PROGRAM

## 2020-02-04 PROCEDURE — 65660000000 HC RM CCU STEPDOWN

## 2020-02-04 PROCEDURE — 74011250637 HC RX REV CODE- 250/637: Performed by: INTERNAL MEDICINE

## 2020-02-04 PROCEDURE — 81001 URINALYSIS AUTO W/SCOPE: CPT

## 2020-02-04 PROCEDURE — 83036 HEMOGLOBIN GLYCOSYLATED A1C: CPT

## 2020-02-04 PROCEDURE — 93306 TTE W/DOPPLER COMPLETE: CPT

## 2020-02-04 PROCEDURE — 74011250637 HC RX REV CODE- 250/637: Performed by: EMERGENCY MEDICINE

## 2020-02-04 PROCEDURE — 82728 ASSAY OF FERRITIN: CPT

## 2020-02-04 PROCEDURE — 82962 GLUCOSE BLOOD TEST: CPT

## 2020-02-04 PROCEDURE — 80061 LIPID PANEL: CPT

## 2020-02-04 PROCEDURE — 80048 BASIC METABOLIC PNL TOTAL CA: CPT

## 2020-02-04 PROCEDURE — 87340 HEPATITIS B SURFACE AG IA: CPT

## 2020-02-04 PROCEDURE — 36415 COLL VENOUS BLD VENIPUNCTURE: CPT

## 2020-02-04 PROCEDURE — 99218 HC RM OBSERVATION: CPT

## 2020-02-04 PROCEDURE — 74011000258 HC RX REV CODE- 258: Performed by: EMERGENCY MEDICINE

## 2020-02-04 PROCEDURE — 95816 EEG AWAKE AND DROWSY: CPT | Performed by: EMERGENCY MEDICINE

## 2020-02-04 PROCEDURE — 83540 ASSAY OF IRON: CPT

## 2020-02-04 RX ORDER — CALCIUM GLUCONATE 94 MG/ML
1 INJECTION, SOLUTION INTRAVENOUS ONCE
Status: COMPLETED | OUTPATIENT
Start: 2020-02-04 | End: 2020-02-04

## 2020-02-04 RX ORDER — NALOXONE HYDROCHLORIDE 0.4 MG/ML
0.4 INJECTION, SOLUTION INTRAMUSCULAR; INTRAVENOUS; SUBCUTANEOUS AS NEEDED
Status: DISCONTINUED | OUTPATIENT
Start: 2020-02-04 | End: 2020-02-05 | Stop reason: HOSPADM

## 2020-02-04 RX ORDER — FAMOTIDINE 20 MG/1
20 TABLET, FILM COATED ORAL DAILY
Status: DISCONTINUED | OUTPATIENT
Start: 2020-02-04 | End: 2020-02-05 | Stop reason: HOSPADM

## 2020-02-04 RX ORDER — DEXTROSE 50 % IN WATER (D50W) INTRAVENOUS SYRINGE
50 AS NEEDED
Status: DISCONTINUED | OUTPATIENT
Start: 2020-02-04 | End: 2020-02-05 | Stop reason: HOSPADM

## 2020-02-04 RX ORDER — HYDRALAZINE HYDROCHLORIDE 20 MG/ML
10 INJECTION INTRAMUSCULAR; INTRAVENOUS
Status: DISCONTINUED | OUTPATIENT
Start: 2020-02-04 | End: 2020-02-05 | Stop reason: HOSPADM

## 2020-02-04 RX ORDER — MAGNESIUM SULFATE 100 %
16 CRYSTALS MISCELLANEOUS AS NEEDED
Status: DISCONTINUED | OUTPATIENT
Start: 2020-02-04 | End: 2020-02-05 | Stop reason: HOSPADM

## 2020-02-04 RX ORDER — LORAZEPAM 2 MG/ML
1 INJECTION INTRAMUSCULAR
Status: DISCONTINUED | OUTPATIENT
Start: 2020-02-04 | End: 2020-02-05 | Stop reason: HOSPADM

## 2020-02-04 RX ORDER — CARVEDILOL 3.12 MG/1
3.12 TABLET ORAL 2 TIMES DAILY WITH MEALS
Status: DISCONTINUED | OUTPATIENT
Start: 2020-02-04 | End: 2020-02-05

## 2020-02-04 RX ORDER — DOCUSATE SODIUM 100 MG/1
100 CAPSULE, LIQUID FILLED ORAL 2 TIMES DAILY
Status: DISCONTINUED | OUTPATIENT
Start: 2020-02-04 | End: 2020-02-05 | Stop reason: HOSPADM

## 2020-02-04 RX ORDER — SODIUM POLYSTYRENE SULFONATE 15 G/60ML
15 SUSPENSION ORAL; RECTAL
Status: COMPLETED | OUTPATIENT
Start: 2020-02-04 | End: 2020-02-04

## 2020-02-04 RX ORDER — AMLODIPINE BESYLATE 5 MG/1
5 TABLET ORAL DAILY
Status: DISCONTINUED | OUTPATIENT
Start: 2020-02-04 | End: 2020-02-05 | Stop reason: HOSPADM

## 2020-02-04 RX ORDER — DEXTROSE MONOHYDRATE AND SODIUM CHLORIDE 5; .45 G/100ML; G/100ML
40 INJECTION, SOLUTION INTRAVENOUS CONTINUOUS
Status: DISCONTINUED | OUTPATIENT
Start: 2020-02-04 | End: 2020-02-05

## 2020-02-04 RX ORDER — SODIUM BICARBONATE 650 MG/1
650 TABLET ORAL 3 TIMES DAILY
Status: DISCONTINUED | OUTPATIENT
Start: 2020-02-04 | End: 2020-02-05 | Stop reason: HOSPADM

## 2020-02-04 RX ADMIN — SODIUM BICARBONATE 650 MG TABLET 650 MG: at 22:18

## 2020-02-04 RX ADMIN — DOCUSATE SODIUM 100 MG: 100 CAPSULE, LIQUID FILLED ORAL at 10:38

## 2020-02-04 RX ADMIN — DEXTROSE MONOHYDRATE AND SODIUM CHLORIDE 40 ML/HR: 5; .45 INJECTION, SOLUTION INTRAVENOUS at 05:52

## 2020-02-04 RX ADMIN — CARVEDILOL 3.12 MG: 6.25 TABLET, FILM COATED ORAL at 10:38

## 2020-02-04 RX ADMIN — CARVEDILOL 3.12 MG: 6.25 TABLET, FILM COATED ORAL at 16:38

## 2020-02-04 RX ADMIN — SODIUM BICARBONATE 650 MG TABLET 650 MG: at 16:38

## 2020-02-04 RX ADMIN — SODIUM POLYSTYRENE SULFONATE 15 G: 15 SUSPENSION ORAL; RECTAL at 06:11

## 2020-02-04 RX ADMIN — AMLODIPINE BESYLATE 5 MG: 5 TABLET ORAL at 10:41

## 2020-02-04 RX ADMIN — DOCUSATE SODIUM 100 MG: 100 CAPSULE, LIQUID FILLED ORAL at 17:50

## 2020-02-04 RX ADMIN — CALCIUM GLUCONATE 1 G: 98 INJECTION, SOLUTION INTRAVENOUS at 04:32

## 2020-02-04 RX ADMIN — FAMOTIDINE 20 MG: 20 TABLET ORAL at 10:38

## 2020-02-04 NOTE — PROGRESS NOTES
Echocardiogram completed. Patient returned to room with armband in place. Report to follow.     [address] [facility]

## 2020-02-04 NOTE — ED NOTES
Received call from [facility] to interrogate patient ICD. It is a [place] per [address]. This verbal order was completed by this nurse. LAINE Delgado was called to notified her that it was done.

## 2020-02-04 NOTE — ROUTINE PROCESS
0715- Bedside and Verbal shift change report given to Olivia RN (oncoming nurse) by Oliva Morales RN (offgoing nurse). Report included the following information SBAR, Kardex, Intake/Output, MAR and Cardiac Rhythm Paced.

## 2020-02-04 NOTE — ED TRIAGE NOTES
Pt states he woke up this morning and called a friend and could not speak or walk well, pt states he then woke up and it was 5 pm he doesn't know if he passed out or went to sleep, pts symptoms have resolved at this time but patients BS was 48 in ER, pt was given 8 oz of orange juice.  Will recheck pts blood sugar

## 2020-02-04 NOTE — ED PROVIDER NOTES
EMERGENCY DEPARTMENT HISTORY AND PHYSICAL EXAM    8:36 PM      Date: 2/3/2020  Patient Name: Horacio Carrasco    History of Presenting Illness     Chief Complaint   Patient presents with    Low Blood Sugar         History Provided By: Patient and Patient's Daughter  Location/Duration/Severity/Modifying factors   HPI  66-year-old male with past medical history of CHF, hypertension, sickle cell disease, pacemaker placement, ESRD presents with an episode of weakness that started at 730 this morning. The patient reports that he woke up in his bed was unable to get up, he reports he could not move. He had his phone next to him and was trying to call his family members on multiple points but was unable to. He thought he was having a stroke. He had episodes of a aphasia. He was unaware but he had bit his tongue. He dozed in and out of sleep until 530 this evening when he was able to call his daughter and then called EMS for transport. He reports drinking 2 alcoholic drinks last night and eating a full meal for dinner. He denies diabetes and is not currently taking insulin. He has never had a seizure before and does not have any known neurological disease. He currently has some weakness in his legs and some soreness in his legs bilaterally he has some tremors in his hands as well as his tongue. He denies nausea, vomiting, abdominal pain, chest pain, shortness of breath. He denies having his pacemaker go off today. PCP: None        Past History     Past Medical History:  Past Medical History:   Diagnosis Date    Hypertension     Sickle cell anemia ([facility] [address]        Past Surgical History:  Past Surgical History:   Procedure Laterality Date    HX ORTHOPAEDIC         Family History:  History reviewed. No pertinent family history.     Social History:  Social History     Tobacco Use    Smoking status: Current Every Day Smoker   Substance Use Topics    Alcohol use: No    Drug use: Yes     Types: Marijuana Allergies: Allergies   Allergen Reactions    Aspirin Other (comments)    Quinine Other (comments)    Sulfa (Sulfonamide Antibiotics) Other (comments)    Tylenol [Acetaminophen] Not Reported This Time         Review of Systems       Review of Systems   Constitutional: Positive for chills and fatigue. Negative for appetite change, diaphoresis and fever. HENT: Positive for mouth sores and sore throat. Negative for congestion, postnasal drip and rhinorrhea. Respiratory: Positive for cough. Negative for chest tightness, shortness of breath and wheezing. Cardiovascular: Negative for chest pain, palpitations and leg swelling. Gastrointestinal: Negative for abdominal distention, abdominal pain, nausea and vomiting. Musculoskeletal: Positive for myalgias. Negative for back pain, gait problem and joint swelling. Neurological: Positive for tremors, speech difficulty and weakness. Negative for dizziness, syncope, facial asymmetry, light-headedness, numbness and headaches. Physical Exam     Visit Vitals  /68 (BP 1 Location: Left arm, BP Patient Position: At rest;Supine)   Pulse 84   Temp 99.3 °F (37.4 °C)   Resp 20   Ht 6' (1.829 m)   Wt 62.6 kg (138 lb)   SpO2 98%   BMI 18.72 kg/m²         Physical Exam  Vitals signs reviewed. Constitutional:       General: He is not in acute distress. Appearance: He is normal weight. He is not diaphoretic. HENT:      Head: Normocephalic. Mouth/Throat:      Mouth: Mucous membranes are dry. Comments: Tongue fasiculations, new tongue ulcer L side and oral mucosal ulcer R side  Eyes:      Extraocular Movements: Extraocular movements intact. Pupils: Pupils are equal, round, and reactive to light. Neck:      Musculoskeletal: Normal range of motion and neck supple. Cardiovascular:      Rate and Rhythm: Normal rate and regular rhythm. Heart sounds: No murmur.    Pulmonary:      Effort: Pulmonary effort is normal. No respiratory distress. Breath sounds: Normal breath sounds. No wheezing. Abdominal:      General: Abdomen is flat. Bowel sounds are normal. There is no distension. Palpations: Abdomen is soft. Tenderness: There is no abdominal tenderness. Musculoskeletal:         General: Tenderness present. No swelling. Right lower leg: No edema. Left lower leg: No edema. Comments: TTP mild in back of calves bilaterally, new onset today. Skin:     General: Skin is warm and dry. Capillary Refill: Capillary refill takes less than 2 seconds. Neurological:      Mental Status: He is alert. Cranial Nerves: No cranial nerve deficit, dysarthria or facial asymmetry. Sensory: No sensory deficit. Motor: Tremor present. No weakness or pronator drift. Coordination: Coordination normal. Finger-Nose-Finger Test and Heel to [employer] normal.           Diagnostic Study Results     Labs -  Recent Results (from the past 12 hour(s))   CBC WITH AUTOMATED DIFF    Collection Time: 02/03/20  6:25 PM   Result Value Ref Range    WBC 6.3 4.6 - 13.2 K/uL    RBC 3.75 (L) 4.70 - 5.50 M/uL    HGB 10.3 (L) 13.0 - 16.0 g/dL    HCT 31.2 (L) 36.0 - 48.0 %    MCV 83.2 74.0 - 97.0 FL    MCH 27.5 24.0 - 34.0 PG    MCHC 33.0 31.0 - 37.0 g/dL    RDW 12.5 11.6 - 14.5 %    PLATELET 930 928 - 186 K/uL    MPV 9.6 9.2 - 11.8 FL    NEUTROPHILS 88 (H) 40 - 73 %    LYMPHOCYTES 11 (L) 21 - 52 %    MONOCYTES 1 (L) 3 - 10 %    EOSINOPHILS 0 0 - 5 %    BASOPHILS 0 0 - 2 %    ABS. NEUTROPHILS 5.5 1.8 - 8.0 K/UL    ABS. LYMPHOCYTES 0.7 (L) 0.9 - 3.6 K/UL    ABS. MONOCYTES 0.1 0.05 - 1.2 K/UL    ABS. EOSINOPHILS 0.0 0.0 - 0.4 K/UL    ABS.  BASOPHILS 0.0 0.0 - 0.1 K/UL    DF AUTOMATED     METABOLIC PANEL, COMPREHENSIVE    Collection Time: 02/03/20  6:25 PM   Result Value Ref Range    Sodium 141 136 - 145 mmol/L    Potassium 5.8 (H) 3.5 - 5.5 mmol/L    Chloride 116 (H) 100 - 111 mmol/L    CO2 13 (L) 21 - 32 mmol/L    Anion gap 12 3.0 - 18 mmol/L    Glucose 48 (LL) 74 - 99 mg/dL    BUN 60 (H) 7.0 - 18 MG/DL    Creatinine 5.95 (H) 0.6 - 1.3 MG/DL    BUN/Creatinine ratio 10 (L) 12 - 20      GFR est AA 12 (L) >60 ml/min/1.73m2    GFR est non-AA 10 (L) >60 ml/min/1.73m2    Calcium 7.7 (L) 8.5 - 10.1 MG/DL    Bilirubin, total 0.6 0.2 - 1.0 MG/DL    ALT (SGPT) 40 16 - 61 U/L    AST (SGOT) 76 (H) 10 - 38 U/L    Alk. phosphatase 100 45 - 117 U/L    Protein, total 7.8 6.4 - 8.2 g/dL    Albumin 3.7 3.4 - 5.0 g/dL    Globulin 4.1 (H) 2.0 - 4.0 g/dL    A-G Ratio 0.9 0.8 - 1.7     MAGNESIUM    Collection Time: 02/03/20  6:25 PM   Result Value Ref Range    Magnesium 1.9 1.6 - 2.6 mg/dL   NT-PRO BNP    Collection Time: 02/03/20  6:25 PM   Result Value Ref Range    NT pro-BNP 4,787 (H) 0 - 900 PG/ML   TROPONIN I    Collection Time: 02/03/20  6:25 PM   Result Value Ref Range    Troponin-I, QT 0.14 (H) 0.0 - 0.045 NG/ML   PROTHROMBIN TIME + INR    Collection Time: 02/03/20  6:25 PM   Result Value Ref Range    Prothrombin time 13.4 11.5 - 15.2 sec    INR 1.0 0.8 - 1.2     PTT    Collection Time: 02/03/20  6:25 PM   Result Value Ref Range    aPTT 33.2 23.0 - 36.4 SEC   GLUCOSE, POC    Collection Time: 02/03/20  9:41 PM   Result Value Ref Range    Glucose (POC) 74 70 - 110 mg/dL   GLUCOSE, POC    Collection Time: 02/03/20  9:49 PM   Result Value Ref Range    Glucose (POC) 84 70 - 110 mg/dL       Radiologic Studies -   CT HEAD WO CONT   Final Result   IMPRESSION:    No acute findings. Nonspecific mild white matter abnormalities, often related to chronic   microvascular disease. MRI would be more sensitive in detecting acute   infarction. Mild ethmoid sinus disease. XR CHEST SNGL V    (Results Pending)         Medical Decision Making   I am the first provider for this patient. I reviewed the vital signs, available nursing notes, past medical history, past surgical history, family history and social history.     Vital Signs-Reviewed the patient's vital signs.      EKG: Paced rhythm, no ST elevations, interpreted by Dr. Pina Sanchez    Records Reviewed: Old Medical Records, Previous electrocardiograms and Previous Laboratory Studies (Time of Review: 8:36 PM)    ED Course: Progress Notes, Reevaluation, and Consults:    ED Course as of Feb 03 2314 Mon Feb 03, 2020 2106 Calcium(!): 7.7 [HS]   2106 Glucose(!!): 48 [HS]   2106 Troponin-I, Qt.(!): 0.14 [HS]   2106 NT pro-BNP(!): 4,787 [HS]      ED Course User Index  [HS] Jared Castaneda MD       Provider Notes (Medical Decision Making):   MDM  Number of Diagnoses or Management Options  Diagnosis management comments: Differential diagnosis includes stroke, hypoglycemia, cardiac induced episode, seizure. The patient does not have any laterality to his weakness or any focal deficits concerning for stroke, CT head was negative as well. The patient denies chest pain or his pacemaker going off, no acute findings on EKG less likely cardiac. The patient presents with hypoglycemia, however this  may have been due to fasting, alcohol intake, or potential seizure. The case was discussed with Dr. Kishore Rowan who agrees that the story is consistent with a potential seizure, key findings were that the patient biting his tongue, and that patient having bilateral leg soreness suggestive of postictal myalgias. Hypoglycemia troponinemia may be due to his seizure as well. The case was discussed with tele-neurology, Dr. Kishore Rowan, who would like the patient to be followed up as an outpatient with neurology for an EEG as well as a cardiac EP guided MRI without contrast.  No other imaging studies need to be performed tonight due to the patient's kidney status a CT with contrast will not be ordered and the best test will be an MRI without contrast for this patient. Patient also has hyperkalemia and hypocalcemia. Patient will be given a 500 mL bolus of normal saline and calcium gluconate.   The patient was very hypoglycemic at 37, the patient was given juice as well as crackers and the patient's glucose and electrolytes will be reassessed prior to discharge. Amount and/or Complexity of Data Reviewed  Clinical lab tests: ordered and reviewed  Tests in the radiology section of CPT®: ordered and reviewed  Discuss the patient with other providers: yes        Procedures        Diagnosis     Clinical Impression:   1. Seizure ([address]    2. Hypoglycemia        Disposition: Likely home, this patient was passed off to the night team for further treatment    Follow-up Information     Follow up With Specialties Details Why Contact Info    [facility]  Schedule an appointment as soon as possible for a visit for neurology follow-up Melonie Quinteros [address] [facility], [place]  [phone]           Patient's Medications    No medications on file     Disclaimer: Sections of this note are dictated using utilizing voice recognition software. Minor typographical errors may be present. If questions arise, please do not hesitate to contact me or call our department.

## 2020-02-04 NOTE — ED NOTES
Attempted to transport to admitting unit, with difficulty. Alerted by transporter Jarrell. Patient actively siezing, nystagmus, eyes driffing to teft, somewhat sfiff, head turned to left, maintaining own airway, unresponsive.

## 2020-02-04 NOTE — PROGRESS NOTES
Spoke with PT.'s RN . MRI has to be coordinate with the representative of the pacemaker / defibrillator. Once coordinate  MRI will be schedule.

## 2020-02-04 NOTE — PROGRESS NOTES
conducted an Emergency Department consultation and Spiritual Assessment for Amanuel Pérez, who is a 75 y.o.,male. Patient's Primary Language is: [place]. According to the patient's EMR Episcopal Affiliation is: [facility].     The reason the Patient came to the hospital is:   Patient Active Problem List    Diagnosis Date Noted    Hyperkalemia 02/04/2020    CKD (chronic kidney disease) 02/04/2020    Seizure-like activity ([facility] [address] 02/04/2020    Seizure ([facility] [address] 02/04/2020        The  provided the following Interventions:  Initiated a relationship of care and support. Offered prayer and assurance of continued prayers on patient's behalf. Chart reviewed. The following outcomes where achieved:  Patient expressed gratitude for 's visit. Assessment:  Patient does not have any Uatsdin/cultural needs that will affect patient's preferences in health care. There are no spiritual or Uatsdin issues which require intervention at this time. Plan:  Chaplains will continue to follow and will provide pastoral care on an as needed/requested basis.  recommends bedside caregivers page  on duty if patient shows signs of acute spiritual or emotional distress.     [id number] [address]   [phone]

## 2020-02-04 NOTE — CONSULTS
[facility] - Consult Note    Consultation request by Richelle Burton MD for advice/opinion related to evaluating Hyperkalemia [E87.5]  Seizure-like activity ([address] [R56.9]  CKD (chronic kidney disease) [N18.9]  Seizure ([address] [R56.9]    Date of  Admission: 2/3/2020  6:28 PM   Primary Care Physician:  None     Assessment:     Patient Active Problem List   Diagnosis Code    Hyperkalemia E87.5    CKD (chronic kidney disease) N18.9    Seizure-like activity ([address] R56.9    Seizure ([address] R56.9       -Possible new onset seizure. Presented with severe weakness, lethargy, difficult speech, tongue biting.  -Indeterminate troponin. Chronically elevated in setting of renal disease. Do not suspect ACS. No recent anginal complaints.  -Hypoglycemia. BS 48.  -Hyperkalemia. K 5.8.  -Chronic kidney disease. Stage V. Has discussed HD as outpatient.  -Cardiomyopathy. EF 20-25% by echo 2/2019. Followed by [facility]. Reports cardiac catheterization was not done given renal disease. Patient is medically managed. Patient is not currently volume overloaded. -[address]. Normal function this admission.  -Hypertension. Suboptimal.  -Sickle cell.  -Tobacco use. Advise cessation. Primary cardiologist AMANDA. Plan: Will review echocardiogram once complete. Will check follow up cardiac biomarkers. AICD interrogation with normal function without events this admission. Will request [place] records. Continue coreg. No ace or arb given renal function. Fluid management per nephrology team.  Will appreciate neurology and nephrology evaluation. History of Present Illness: This is a 75 y.o. male admitted for Hyperkalemia [E87.5]  Seizure-like activity ([address] [R56.9]  CKD (chronic kidney disease) [N18.9]  Seizure ([address] [R56.9]. Patient complains of:  Weakness. Patient reports yesterday morning he woke up and he was profoundly weak. He had difficult speech. He could not reach his phone.  He dozed off. His family found him later and called EMS. Patient was hypoglycemic. Patient bit his tongue. Patient is admitted with possible seizure. Patient is noted ot have indeterminate troponin. Patient denies any CP, palp, syncope, SOB, MOHAMUD, JACKLYN. Patient has h/o CMY, AICD, CKD all medically managed and followed at [place] [address]. Cardiac risk factors: male gender, hypertension      Review of Symptoms:    Constitutional: negative for fevers and chills  Eyes: negative for visual disturbance  Ears, nose, mouth, throat, and face: negative for nasal congestion  Respiratory: negative for cough  Cardiovascular: negative for chest pain, palpitations, syncope, orthopnea, lower extremity edema  Gastrointestinal: negative for vomiting and diarrhea  Genitourinary:negative for dysuria  Hematologic/lymphatic: negative for bleeding  Musculoskeletal:positive for muscle weakness  Neurological: positive for weakness     Past Medical History:     Past Medical History:   Diagnosis Date    Hypertension     Sickle cell anemia (HCC)          Social History:     Social History     Socioeconomic History    Marital status: SINGLE     Spouse name: Not on file    Number of children: Not on file    Years of education: Not on file    Highest education level: Not on file   Tobacco Use    Smoking status: Current Every Day Smoker   Substance and Sexual Activity    Alcohol use: No    Drug use: Yes     Types: Marijuana        Family History:   History reviewed. No pertinent family history. Medications:      Allergies   Allergen Reactions    Aspirin Other (comments)    Quinine Other (comments)    Sulfa (Sulfonamide Antibiotics) Other (comments)    Tylenol [Acetaminophen] Not Reported This Time        Current Facility-Administered Medications   Medication Dose Route Frequency    dextrose 5 % - 0.45% NaCl infusion  40 mL/hr IntraVENous CONTINUOUS    naloxone (NARCAN) injection 0.4 mg  0.4 mg IntraVENous PRN    docusate sodium (COLACE) capsule 100 mg  100 mg Oral BID    glucose chewable tablet 16 g  16 g Oral PRN    glucagon (GLUCAGEN) injection 1 mg  1 mg IntraMUSCular PRN    dextrose (D50W) injection syrg 25 g  50 mL IntraVENous PRN    LORazepam (ATIVAN) injection 1 mg  1 mg IntraVENous Q4H PRN    carvediloL (COREG) tablet 3.125 mg  3.125 mg Oral BID WITH MEALS    hydrALAZINE (APRESOLINE) 20 mg/mL injection 10 mg  10 mg IntraVENous Q6H PRN    famotidine (PEPCID) tablet 20 mg  20 mg Oral DAILY     No current outpatient medications on file. Physical Exam:     Visit Vitals  /69 (BP 1 Location: Left arm, BP Patient Position: At rest)   Pulse 68   Temp 98.3 °F (36.8 °C)   Resp 16   Ht 6' (1.829 m)   Wt 62.6 kg (138 lb)   SpO2 98%   BMI 18.72 kg/m²     BP Readings from Last 3 Encounters:   02/04/20 164/69   10/21/18 114/65   08/20/18 135/76     Pulse Readings from Last 3 Encounters:   02/04/20 68   10/21/18 69   08/20/18 72     Wt Readings from Last 3 Encounters:   02/03/20 62.6 kg (138 lb)   08/20/18 63.5 kg (140 lb)   01/07/13 66.7 kg (147 lb)       General:  alert, cooperative, no distress, appears stated age  Neck:  no JVD  Lungs:  clear to auscultation bilaterally  Heart:  regular rate and rhythm, S1, S2 normal, no murmur, click, rub or gallop  Abdomen:  abdomen is soft without significant tenderness, masses, organomegaly or guarding  Extremities:  extremities normal, atraumatic, no cyanosis or edema  Skin: Warm and dry.  no hyperpigmentation, vitiligo, or suspicious lesions  Neuro: alert, oriented x3, affect appropriate  Psych: non focal     Data Review:     Recent Labs     02/03/20  1825   WBC 6.3   HGB 10.3*   HCT 31.2*        Recent Labs     02/04/20  0057 02/03/20  1825    141   K 5.6* 5.8*   * 116*   CO2 15* 13*   GLU 79 48*   BUN 67* 60*   CREA 5.81* 5.95*   CA 7.9* 7.7*   MG  --  1.9   ALB  --  3.7   SGOT  --  76*   ALT  --  40   INR  --  1.0       Results for orders placed or performed during the hospital encounter of 02/03/20   EKG, 12 LEAD, INITIAL   Result Value Ref Range    Ventricular Rate 67 BPM    Atrial Rate 67 BPM    P-R Interval 112 ms    QRS Duration 148 ms    Q-T Interval 480 ms    QTC Calculation (Bezet) 507 ms    Calculated P Axis 81 degrees    Calculated R Axis -86 degrees    Calculated T Axis 85 degrees    Diagnosis       Atrial-sensed ventricular-paced rhythm  Abnormal ECG  When compared with ECG of 20-OCT-2018 21:28,  Electronic ventricular pacemaker has replaced Sinus rhythm         All Cardiac Markers in the last 24 hours:    Lab Results   Component Value Date/Time    TROIQ 0.14 (H) 02/03/2020 06:25 PM       Last Lipid:    Lab Results   Component Value Date/Time    Cholesterol, total 136 03/29/2010 04:43 AM    HDL Cholesterol 62 (H) 03/29/2010 04:43 AM    LDL, calculated 56.2 03/29/2010 04:43 AM    Triglyceride 89 03/29/2010 04:43 AM    CHOL/HDL Ratio 2.2 03/29/2010 04:43 AM       Signed By: LAINE Godwin     February 4, 2020

## 2020-02-04 NOTE — ROUTINE PROCESS
TRANSFER - IN REPORT: 
 
Verbal report received from PROSPER Lowe(name) on Kem Polanco  being received from ED(unit) for routine progression of care Report consisted of patients Situation, Background, Assessment and  
Recommendations(SBAR). Information from the following report(s) SBAR, Kardex and Recent Results was reviewed with the receiving nurse. Opportunity for questions and clarification was provided. Assessment completed upon patients arrival to unit and care assumed.

## 2020-02-04 NOTE — H&P
Hospitalist Admission History and Physical    NAME:  Shantal Carbone   :   1955   MRN:   [id number]     PCP:  None  Date/Time:  2020   Subjective:   CHIEF COMPLAINT:  weakness    HISTORY OF PRESENT ILLNESS:     This is a 67-year-old male with known past medical history of chronic kidney disease stage V and hypertension who presented to the ED with complaints of an episode of weakness. Patient states that yesterday morning when he woke up he was not able to move his arms and legs. Patient states that there was also. For which she was not able to speak. Patient states that then he dozed off and then woke up in the evening when he called his daughter for help and then paramedics were called. In the ED patient was noted to have a low blood sugar of 48. Patient does not have a history of diabetes. Patient did state that he had 2 alcoholic drinks the previous night during the game. Patient states that he drinks occasionally. In the ED patient was also noted to have a tongue bite. When I saw the patient his symptoms had improved and patient is awake and alert. Patient denies any current weakness or numbness. No history of any headaches or visual changes. No history of any prior seizure episode. No history of any chest pain or shortness of breath. No history of any cough fever or chills. No history of any abdominal pain urinary complaints diarrhea or rectal bleeding. No history of any leg swelling. No history of any rash. Past Medical History:   Diagnosis Date    Hypertension     Sickle cell anemia (HCC)         Past Surgical History:   Procedure Laterality Date    HX ORTHOPAEDIC         Social History     Tobacco Use    Smoking status: Current Every Day Smoker   Substance Use Topics    Alcohol use: No        History reviewed. No pertinent family history.      Allergies   Allergen Reactions    Aspirin Other (comments)    Quinine Other (comments)    Sulfa (Sulfonamide Antibiotics) Other (comments)    Tylenol [Acetaminophen] Not Reported This Time        None       REVIEW OF SYSTEMS:    Review of Systems  GENERAL: Patient alert, awake and oriented times 3, able to communicate full sentences and not in distress. HEENT: No change in vision, no earache, tinnitus, sore throat or sinus congestion. NECK: No pain or stiffness. PULMONARY: No shortness of breath, cough or wheeze. Cardiovascular: no pnd or orthopnea, no CP  GASTROINTESTINAL: No abdominal pain, nausea, vomiting or diarrhea, melena or bright red blood per rectum. GENITOURINARY: No urinary frequency, urgency, hesitancy or dysuria. MUSCULOSKELETAL: No back pain, no leg pain  DERMATOLOGIC: No rash, no itching, no lesions. ENDOCRINE: No polyuria, polydipsia, no heat or cold intolerance. No recent change in weight. HEMATOLOGICAL: No anemia or easy bruising or bleeding. NEUROLOGIC: No headache, seizures, numbness, tingling       Objective:   VITALS:    Visit Vitals  /68 (BP 1 Location: Left arm, BP Patient Position: At rest;Supine)   Pulse 84   Temp 99.3 °F (37.4 °C)   Resp 20   Ht 6' (1.829 m)   Wt 62.6 kg (138 lb)   SpO2 98%   BMI 18.72 kg/m²     Temp (24hrs), Av.5 °F (36.9 °C), Min:97.6 °F (36.4 °C), Max:99.3 °F (37.4 °C)      PHYSICAL EXAM:   General:    Lying in bed in no acute distress. HEENT:  Pupils equal.  Sclera anicteric. Conjunctiva pink. Mucous membranes                           Moist, no ear or nasal discharge  Neck:  Supple. Trachea midline. No accessory muscle use. No thyromegaly. No jugular venous distention, no carotid bruit  CV:                  Regular rate and rhythm. S1S2+  Lungs:   Clear to auscultation bilaterally. No Wheezing or Rhonchi. No rales. Abdomen:   Soft, non-tender. Not distended. Bowel sounds normal.   Extremities: No cyanosis. No edema. Pulses 1+ b/l  Neurologic: Alert and oriented X 3. Follows commands, responds appropriately.  No focal neurological deficit was noted  Skin:                Warm and dry. No rashes. LAB DATA REVIEWED:    No components found for: [place]  Recent Labs     02/04/20  0057 02/03/20  1825    141   K 5.6* 5.8*   * 116*   CO2 15* 13*   BUN 67* 60*   CREA 5.81* 5.95*   GLU 79 48*   CA 7.9* 7.7*   ALB  --  3.7   WBC  --  6.3   HGB  --  10.3*   HCT  --  31.2*   PLT  --  165             Assessment/Plan:      Active Problems:    Hyperkalemia (2/4/2020)      CKD (chronic kidney disease) (2/4/2020)      Seizure-like activity (HCC) (2/4/2020)      Seizure (HCC) (2/4/2020)     CKD5  Hypoglycemia  Elevated trop  Has AICD  HTN    ___________________________________________________  PLAN:      Patient will be admitted to telemetry. Patient has received Kayexalate in the ED and nephrology has been consulted. Potassium will be monitored. Patient will be on seizure precautions. Please consult neurology in the morning. We will order an EEG. Sugars will be monitored. We will start the patient on dextrose infusion cautiously given his advanced kidney disease. We will trend cardiac biomarkers. Echo will be checked. Cardiology will be consulted. Blood pressure will be monitored. Rest depending on patient's further hospital course. Plan of care was discussed with the patient and he verbalized understanding and agreed. I also discussed the level of care and patient wishes to be a full code.       Prophylaxis:  []Lovenox  []Coumadin  []Hep SQ  [x]SCDs  []H2B/PPI    Discussed Code Status:    [x]Full Code      []DNR     ___________________________________________________    Care Plan discussed with:    [x]Patient   []Family    []ED Care Manager  [x]ED Doc   []Specialist :    Total Time Coordinating Admission:  55   minutes    []Total Critical Care Time:     ___________________________________________________  Admitting Physician: Ramona Fox MD

## 2020-02-04 NOTE — CONSULTS
Consult Note  Consult requested by: Dr. Rafael Rincon is a 75 y.o. male [address] REFUSED who is being seen on consult for CKD stage 5/ESRD  Chief Complaint   Patient presents with    Low Blood Sugar     Admission diagnosis: <principal problem not specified>     HPI: 74 yo AA male admitted for presumed SZ. Patient reports being in a super bowl party ate some potato salad, ribs had a couple of alcoholic drinks. Yesterday am at around 7 he said he could not move his legs and he thinks he fell asleep but does not remember anything after 7AM. He woke up around 5 PM called his daughter and he was brought in. He denies any fever or chills, no N/V/D. He denies any CP/SOB. Hx of CKD stage 5 ( Renal Bx 2015 GS and IS fibrosis). [facility] and apparently saw him last week and was told he can wait to start HD. He remembers GFR was 12. He has an AVF left upper arm cleared for use in Aug of last year by Dr Vijay Yuan. He claims he still makes a lot of urine. Hx of CM and Afib off anticoag due to GIB. He had ablation done last year and had a PE ? ? post procedure. No prior Hx of DM/SZ.  + HTN on Coreg, hydralazine and Lasix. Also has had issues with metabolic acidosis and hyperkalemia supposed to be on Sodium bicarb, non compliant bec it \"irritates my stomach\"  Hx of anemia post BT last year.     Past Medical History:   Diagnosis Date    Hypertension     Sickle cell anemia (HCC)       Past Surgical History:   Procedure Laterality Date    HX ORTHOPAEDIC         Social History     Socioeconomic History    Marital status: SINGLE     Spouse name: Not on file    Number of children: Not on file    Years of education: Not on file    Highest education level: Not on file   Occupational History    Not on file   Social Needs    Financial resource strain: Not on file    Food insecurity:     Worry: Not on file     Inability: Not on file    Transportation needs: Medical: Not on file     Non-medical: Not on file   Tobacco Use    Smoking status: Current Every Day Smoker   Substance and Sexual Activity    Alcohol use: No    Drug use: Yes     Types: Marijuana    Sexual activity: Not on file   Lifestyle    Physical activity:     Days per week: Not on file     Minutes per session: Not on file    Stress: Not on file   Relationships    Social connections:     Talks on phone: Not on file     Gets together: Not on file     Attends Yazidism service: Not on file     Active member of club or organization: Not on file     Attends meetings of clubs or organizations: Not on file     Relationship status: Not on file    Intimate partner violence:     Fear of current or ex partner: Not on file     Emotionally abused: Not on file     Physically abused: Not on file     Forced sexual activity: Not on file   Other Topics Concern    Not on file   Social History Narrative    Not on file       History reviewed. No pertinent family history.   Allergies   Allergen Reactions    Aspirin Other (comments)    Quinine Other (comments)    Sulfa (Sulfonamide Antibiotics) Other (comments)    Tylenol [Acetaminophen] Not Reported This Time        Home Medications:     None       Current Facility-Administered Medications   Medication Dose Route Frequency    dextrose 5 % - 0.45% NaCl infusion  40 mL/hr IntraVENous CONTINUOUS    naloxone (NARCAN) injection 0.4 mg  0.4 mg IntraVENous PRN    docusate sodium (COLACE) capsule 100 mg  100 mg Oral BID    glucose chewable tablet 16 g  16 g Oral PRN    glucagon (GLUCAGEN) injection 1 mg  1 mg IntraMUSCular PRN    dextrose (D50W) injection syrg 25 g  50 mL IntraVENous PRN    LORazepam (ATIVAN) injection 1 mg  1 mg IntraVENous Q4H PRN    carvediloL (COREG) tablet 3.125 mg  3.125 mg Oral BID WITH MEALS    hydrALAZINE (APRESOLINE) 20 mg/mL injection 10 mg  10 mg IntraVENous Q6H PRN    famotidine (PEPCID) tablet 20 mg  20 mg Oral DAILY     No current outpatient medications on file. Review of Systems:   Pertinent items are noted in HPI. Data Review:    Labs: Results:       Chemistry Recent Labs     02/04/20  0057 02/03/20  1825   GLU 79 48*    141   K 5.6* 5.8*   * 116*   CO2 15* 13*   BUN 67* 60*   CREA 5.81* 5.95*   CA 7.9* 7.7*   AGAP 10 12   BUCR 12 10*   AP  --  100   TP  --  7.8   ALB  --  3.7   GLOB  --  4.1*   AGRAT  --  0.9      CBC w/Diff Recent Labs     02/03/20 1825   WBC 6.3   RBC 3.75*   HGB 10.3*   HCT 31.2*      GRANS 88*   LYMPH 11*   EOS 0      Coagulation Recent Labs     02/03/20 1825   PTP 13.4   INR 1.0   APTT 33.2       Iron/Ferritin No results for input(s): IRON in the last 72 hours. No lab exists for component: TIBCCALC   BNP No results for input(s): BNPP in the last 72 hours. Cardiac Enzymes No results for input(s): CPK, CKND1, DANNA in the last 72 hours. No lab exists for component: CKRMB, TROIP   Liver Enzymes Recent Labs     02/03/20 1825   TP 7.8   ALB 3.7      SGOT 76*      Thyroid Studies No results found for: T4, T3U, TSH, TSHEXT        IMAGES: CT head no acute findings  CXR no acute findings  EKG Atrial-sensed ventricular-paced rhythm   Abnormal ECG   When compared with ECG of 20-OCT-2018 21:28,   Electronic ventricular pacemaker has replaced Sinus rhythm           Physical Assessment:     Visit Vitals  /70 (BP 1 Location: Right arm, BP Patient Position: At rest;Sitting)   Pulse 64   Temp 98.7 °F (37.1 °C)   Resp 14   Ht 6' (1.829 m)   Wt 62.6 kg (138 lb)   SpO2 97%   BMI 18.72 kg/m²     Last 3 Recorded Weights in this Encounter    02/03/20 1817   Weight: 62.6 kg (138 lb)     No intake or output data in the 24 hours ending 02/04/20 0936    Physial Exam:  General appearance: alert, cooperative, no distress, appears stated age  Skin: normal coloration and turgor, no rashes, no suspicious skin lesions noted.   HEENT: non icteric, pinkish conj  Neck: No JVD  Lungs: clear to auscultation bilaterally, no wheezing  Heart: regular rate and rhythm, no rub, AICD Right upper chaest  Abdomen: soft, non-tender. Bowel sounds normal   Extremities: No LE edema, Left upper arm AVF + bruit    IMPRESSION AND PLAN:   CKD 5 /ESRD from GS and IS fibrosis admitted for SZ ? Patient would benefit from starting HD but he is refusing. He has significant metabolic acidosis and came in with hyperkalemia. He has a functioning AVF cleared for use. If he refuses HD he will need to resume Sodium bicarb, trend K, stay on low K diet, may need prn kayexalate/Veltassa and see his [place] nephrologist as soon as possible post d/c. Avoid any nephrotoxic drugs a d adjust all meds to renal function. Anemia from CKD. Check Fe stores, No need for LIZZY at this time  Hypocalcemia  Most likely from SHPT. Check phos and IPTH. May need to start Calcium base binder and active vit D  HTN poor control. Cont Coreg, Add CCB, defer to cardio re BB  Cardiomyopathy defer to cardio     Thank you will follow with you.     Rigo Kern MD  February 4, 2020

## 2020-02-04 NOTE — PROGRESS NOTES
MRI Safety Screening form needs to be filled out and FAXED to [phone] MRI can be scheduled. If unable to acquire information from patient  [facility] must be contacted, or screening xrays will need to be ordered.     IF pt is Claustrophobic or will need Pain Meds please have these ordered in advance to help facilitate MRI exam.

## 2020-02-04 NOTE — PROGRESS NOTES
Patient admitted this morning, seen for follow-up. Patient feeling better, states his weakness is improved now. He feels he is almost at baseline. He woke up with a sore tongue, sore muscles and also back to his bed. Visit Vitals  /76   Pulse 62   Temp 98.7 °F (37.1 °C)   Resp 14   Ht 6' (1.829 m)   Wt 62.6 kg (138 lb)   SpO2 96%   BMI 18.72 kg/m²       Exam  General:  Alert, cooperative, no acute distress    Pulmonary:  CTA Bilaterally. No Wheezing/Rhonchi/Rales. Cardiovascular: Regular rate and Rhythm. GI:  Soft, Non distended, Non tender. + Bowel sounds. Extremities:  No edema, cyanosis, clubbing. No calf tenderness. Psych: Good insight. Not anxious or agitated. Neurologic: Alert and oriented X 3. No acute neuro deficits. Suspected nocturnal seizures: Neurology evaluation, sent message to Dr. Rosenda Marie, no need for AED. Telemetry neurology input noted, will order MRI if defibrillator is MRI compatible. Discussed with RN, she will will call [facility]. Potassium normal, nephrology input noted. Cardiology input noted,  AICD interrogation normal.  Blood sugar better, will continue to check blood sugars closely. If blood sugars stay more than 150s we will discontinue IV fluids. Patient tolerating p.o. diet well. Discussed with the patient and RN.

## 2020-02-05 VITALS
HEIGHT: 72 IN | DIASTOLIC BLOOD PRESSURE: 84 MMHG | RESPIRATION RATE: 19 BRPM | WEIGHT: 138 LBS | BODY MASS INDEX: 18.69 KG/M2 | TEMPERATURE: 98 F | OXYGEN SATURATION: 97 % | HEART RATE: 66 BPM | SYSTOLIC BLOOD PRESSURE: 169 MMHG

## 2020-02-05 LAB
25(OH)D3 SERPL-MCNC: 9.3 NG/ML (ref 30–100)
ANION GAP SERPL CALC-SCNC: 4 MMOL/L (ref 3–18)
BASOPHILS # BLD: 0 K/UL (ref 0–0.1)
BASOPHILS NFR BLD: 1 % (ref 0–2)
BUN SERPL-MCNC: 62 MG/DL (ref 7–18)
BUN/CREAT SERPL: 11 (ref 12–20)
CALCIUM SERPL-MCNC: 7.5 MG/DL (ref 8.5–10.1)
CHLORIDE SERPL-SCNC: 118 MMOL/L (ref 100–111)
CO2 SERPL-SCNC: 20 MMOL/L (ref 21–32)
CREAT SERPL-MCNC: 5.56 MG/DL (ref 0.6–1.3)
DIFFERENTIAL METHOD BLD: ABNORMAL
EOSINOPHIL # BLD: 0.1 K/UL (ref 0–0.4)
EOSINOPHIL NFR BLD: 3 % (ref 0–5)
ERYTHROCYTE [DISTWIDTH] IN BLOOD BY AUTOMATED COUNT: 12.3 % (ref 11.6–14.5)
GLUCOSE BLD STRIP.AUTO-MCNC: 143 MG/DL (ref 70–110)
GLUCOSE BLD STRIP.AUTO-MCNC: 98 MG/DL (ref 70–110)
GLUCOSE SERPL-MCNC: 188 MG/DL (ref 74–99)
HCT VFR BLD AUTO: 24.3 % (ref 36–48)
HGB BLD-MCNC: 8.4 G/DL (ref 13–16)
LYMPHOCYTES # BLD: 1 K/UL (ref 0.9–3.6)
LYMPHOCYTES NFR BLD: 28 % (ref 21–52)
MAGNESIUM SERPL-MCNC: 1.8 MG/DL (ref 1.6–2.6)
MCH RBC QN AUTO: 28.5 PG (ref 24–34)
MCHC RBC AUTO-ENTMCNC: 34.6 G/DL (ref 31–37)
MCV RBC AUTO: 82.4 FL (ref 74–97)
MONOCYTES # BLD: 0.5 K/UL (ref 0.05–1.2)
MONOCYTES NFR BLD: 15 % (ref 3–10)
NEUTS SEG # BLD: 1.8 K/UL (ref 1.8–8)
NEUTS SEG NFR BLD: 53 % (ref 40–73)
PHOSPHATE SERPL-MCNC: 2.7 MG/DL (ref 2.5–4.9)
PLATELET # BLD AUTO: 124 K/UL (ref 135–420)
PMV BLD AUTO: 9.2 FL (ref 9.2–11.8)
POTASSIUM SERPL-SCNC: 4.3 MMOL/L (ref 3.5–5.5)
RBC # BLD AUTO: 2.95 M/UL (ref 4.7–5.5)
SODIUM SERPL-SCNC: 142 MMOL/L (ref 136–145)
WBC # BLD AUTO: 3.4 K/UL (ref 4.6–13.2)

## 2020-02-05 PROCEDURE — 74011000258 HC RX REV CODE- 258: Performed by: EMERGENCY MEDICINE

## 2020-02-05 PROCEDURE — 85025 COMPLETE CBC W/AUTO DIFF WBC: CPT

## 2020-02-05 PROCEDURE — 82962 GLUCOSE BLOOD TEST: CPT

## 2020-02-05 PROCEDURE — 36415 COLL VENOUS BLD VENIPUNCTURE: CPT

## 2020-02-05 PROCEDURE — 97161 PT EVAL LOW COMPLEX 20 MIN: CPT

## 2020-02-05 PROCEDURE — 84100 ASSAY OF PHOSPHORUS: CPT

## 2020-02-05 PROCEDURE — 74011250637 HC RX REV CODE- 250/637: Performed by: INTERNAL MEDICINE

## 2020-02-05 PROCEDURE — 97165 OT EVAL LOW COMPLEX 30 MIN: CPT

## 2020-02-05 PROCEDURE — 83735 ASSAY OF MAGNESIUM: CPT

## 2020-02-05 PROCEDURE — 82306 VITAMIN D 25 HYDROXY: CPT

## 2020-02-05 PROCEDURE — 80048 BASIC METABOLIC PNL TOTAL CA: CPT

## 2020-02-05 PROCEDURE — 74011250637 HC RX REV CODE- 250/637: Performed by: EMERGENCY MEDICINE

## 2020-02-05 RX ORDER — CALCIUM CARBONATE 500(1250)
500 TABLET ORAL
Status: DISCONTINUED | OUTPATIENT
Start: 2020-02-05 | End: 2020-02-05 | Stop reason: HOSPADM

## 2020-02-05 RX ORDER — CARVEDILOL 6.25 MG/1
6.25 TABLET ORAL 2 TIMES DAILY WITH MEALS
Qty: 60 TAB | Refills: 0 | Status: SHIPPED | OUTPATIENT
Start: 2020-02-05

## 2020-02-05 RX ORDER — FUROSEMIDE 40 MG/1
40 TABLET ORAL DAILY
Qty: 30 TAB | Refills: 0 | Status: SHIPPED | OUTPATIENT
Start: 2020-02-05 | End: 2020-02-05 | Stop reason: SDUPTHER

## 2020-02-05 RX ORDER — CALCIUM CARBONATE 500(1250)
1 TABLET ORAL
Qty: 30 TAB | Refills: 0 | Status: SHIPPED | OUTPATIENT
Start: 2020-02-05

## 2020-02-05 RX ORDER — SODIUM BICARBONATE 650 MG/1
650 TABLET ORAL 3 TIMES DAILY
Qty: 90 TAB | Refills: 0 | Status: SHIPPED | OUTPATIENT
Start: 2020-02-05

## 2020-02-05 RX ORDER — FUROSEMIDE 40 MG/1
80 TABLET ORAL DAILY
Status: DISCONTINUED | OUTPATIENT
Start: 2020-02-05 | End: 2020-02-05 | Stop reason: HOSPADM

## 2020-02-05 RX ORDER — CARVEDILOL 6.25 MG/1
6.25 TABLET ORAL 2 TIMES DAILY WITH MEALS
Status: DISCONTINUED | OUTPATIENT
Start: 2020-02-05 | End: 2020-02-05 | Stop reason: HOSPADM

## 2020-02-05 RX ORDER — FUROSEMIDE 40 MG/1
80 TABLET ORAL DAILY
Qty: 30 TAB | Refills: 0 | Status: SHIPPED | OUTPATIENT
Start: 2020-02-05

## 2020-02-05 RX ORDER — AMLODIPINE BESYLATE 5 MG/1
5 TABLET ORAL DAILY
Qty: 30 TAB | Refills: 0 | Status: SHIPPED | OUTPATIENT
Start: 2020-02-06

## 2020-02-05 RX ADMIN — AMLODIPINE BESYLATE 5 MG: 5 TABLET ORAL at 08:54

## 2020-02-05 RX ADMIN — SODIUM BICARBONATE 650 MG TABLET 650 MG: at 16:08

## 2020-02-05 RX ADMIN — FAMOTIDINE 20 MG: 20 TABLET ORAL at 08:54

## 2020-02-05 RX ADMIN — DEXTROSE MONOHYDRATE AND SODIUM CHLORIDE 40 ML/HR: 5; .45 INJECTION, SOLUTION INTRAVENOUS at 08:59

## 2020-02-05 RX ADMIN — DOCUSATE SODIUM 100 MG: 100 CAPSULE, LIQUID FILLED ORAL at 08:54

## 2020-02-05 RX ADMIN — SODIUM BICARBONATE 650 MG TABLET 650 MG: at 08:55

## 2020-02-05 RX ADMIN — FUROSEMIDE 80 MG: 40 TABLET ORAL at 12:33

## 2020-02-05 RX ADMIN — CARVEDILOL 3.12 MG: 6.25 TABLET, FILM COATED ORAL at 08:55

## 2020-02-05 NOTE — PROGRESS NOTES
[facility]  -  Progress Note      Patient: Sobeida Kwon MRN: [id number]  SSN: [id number]    YOB: 1955  Age: 75 y.o. Sex: male      Admit Date: 2/3/2020    Assessment:     -Possible new onset seizure. Presented with severe weakness, lethargy, difficult speech, tongue biting.  -Indeterminate troponin. Chronically elevated in setting of renal disease. Do not suspect ACS. No recent anginal complaints.  -Echo 2/4/2020: Normal LV cavity size and wall thickness, EF 40-45% (improved from 02/2019), grade 2 diastolic dysfunction, dilated RV, mild to moderate aortic insufficiency, mild pulmonary hypertension with PASP 40 mmHg, severely dilated LA  -Cardiomyopathy. EF 20-25% by echo 2/2019. Followed by [facility]. Reports cardiac catheterization was not done given renal disease. Patient is medically managed. Patient is not currently volume overloaded. -[address]. Normal function this admission.  -Hypoglycemia. BS 48.  -Hyperkalemia. K 5.8.  -Chronic kidney disease. Stage V. Has discussed HD as outpatient.  -Hypertension. Suboptimal.  -Sickle cell.  -Tobacco use. Advise cessation.     Primary cardiologist [address] [place] [address]. Plan:     -Continue medical therapy. Will increase Coreg dose to 6.25 mg BID. -Not on ACEi/ARB due to renal function. -Appreciate assistance of nephrology team, starting 80 mg PO Lasix this AM.  -Appreciate neurology consultation. Subjective:     No new complaints.       Objective:      Patient Vitals for the past 8 hrs:   Temp Pulse Resp BP SpO2   02/05/20 0750 98.3 °F (36.8 °C) 65 19 161/78 97 %   02/05/20 0422 98 °F (36.7 °C) 70 18 136/71 94 %         Patient Vitals for the past 96 hrs:   Weight   02/04/20 0944 138 lb (62.6 kg)   02/03/20 1817 138 lb (62.6 kg)         Intake/Output Summary (Last 24 hours) at 2/5/2020 1110  Last data filed at 2/5/2020 0753  Gross per 24 hour   Intake 1241.33 ml   Output 650 ml   Net 591.33 ml       Physical Exam:  General:  alert, cooperative, no distress, appears stated age  Neck:  supple  Lungs:  clear to auscultation bilaterally  Heart:  Regular rate and rhythm  Abdomen:  abdomen is soft without significant tenderness, masses, organomegaly or guarding  Extremities:  Atraumatic, no edema     Data Review:     Labs: Results:       Chemistry Recent Labs     02/05/20  0120 02/04/20  1130 02/04/20  0057 02/03/20  1825   * 110* 79 48*    143 142 141   K 4.3 5.0 5.6* 5.8*   * 117* 117* 116*   CO2 20* 18* 15* 13*   BUN 62* 66* 67* 60*   CREA 5.56* 5.89* 5.81* 5.95*   CA 7.5* 8.3*  8.2* 7.9* 7.7*   MG 1.8  --   --  1.9   PHOS 2.7  --   --   --    AGAP 4 8 10 12   BUCR 11* 11* 12 10*   AP  --   --   --  100   TP  --   --   --  7.8   ALB  --   --   --  3.7   GLOB  --   --   --  4.1*   AGRAT  --   --   --  0.9      CBC w/Diff Recent Labs     02/05/20  0120 02/03/20  1825   WBC 3.4* 6.3   RBC 2.95* 3.75*   HGB 8.4* 10.3*   HCT 24.3* 31.2*   * 165   GRANS 53 88*   LYMPH 28 11*   EOS 3 0      Cardiac Enzymes Lab Results   Component Value Date/Time     (H) 02/04/2020 08:18 PM     (H) 02/04/2020 11:30 AM    CKMB 5.4 (H) 02/04/2020 08:18 PM    CKMB 6.1 (H) 02/04/2020 11:30 AM    CKND1 0.9 02/04/2020 08:18 PM    CKND1 1.0 02/04/2020 11:30 AM    TROIQ 0.17 (H) 02/04/2020 08:18 PM    TROIQ 0.19 (H) 02/04/2020 11:30 AM      Coagulation Recent Labs     02/03/20  1825   PTP 13.4   INR 1.0   APTT 33.2       Lipid Panel Lab Results   Component Value Date/Time    Cholesterol, total 111 02/04/2020 11:30 AM    HDL Cholesterol 65 (H) 02/04/2020 11:30 AM    LDL, calculated 18.4 02/04/2020 11:30 AM    VLDL, calculated 27.6 02/04/2020 11:30 AM    Triglyceride 138 02/04/2020 11:30 AM    CHOL/HDL Ratio 1.7 02/04/2020 11:30 AM      Liver Enzymes Recent Labs     02/03/20  1825   TP 7.8   ALB 3.7      SGOT 76*

## 2020-02-05 NOTE — PROGRESS NOTES
Problem: Self Care Deficits Care Plan (Adult)  Goal: *Acute Goals and Plan of Care (Insert Text)  Outcome: Resolved/Met   OCCUPATIONAL THERAPY EVALUATION/DISCHARGE    Patient: Jorge Luis Glover (51 y.o. male)  Date: 2/5/2020  Primary Diagnosis: Hyperkalemia [E87.5]  Seizure-like activity ([facility] [R56.9]  CKD (chronic kidney disease) [N18.9]  Seizure ([facility] [R56.9]        Precautions:      PLOF: Pt reports he lives alone in a [facility] with 4STE. Pt was (I) with basic self-care/ADLs, IADLs, and functional mobility without AD PTA. ASSESSMENT AND RECOMMENDATIONS:  Upon entering room, pt supine with HOB elevated, alert, and agreeable to therapy session. Based on the objective data described below, the patient presents he is (I) with basic self-care/ADLs. No LOB noted during functional transfers to toilet. Pt reported he is in the process of making his bathroom handicap accessible, approved by the [place]. Educated pt on the role of Ot and evaluation process with pt demo good understanding. As pt presents he is at his baseline, skilled occupational therapy is not indicated at this time.     Discharge Recommendations: None  Further Equipment Recommendations for Discharge: N/A     SUBJECTIVE:   Patient stated I used to work for the [employer].    OBJECTIVE DATA SUMMARY:     Past Medical History:   Diagnosis Date    Chronic kidney disease     Hypertension     Sickle cell anemia ([facility]      Past Surgical History:   Procedure Laterality Date    HX ORTHOPAEDIC       Barriers to Learning/Limitations: None  Compensate with: visual, verbal, tactile, kinesthetic cues/model    Home Situation:   Home Situation  Home Environment: Private residence  # Steps to Enter: 1  One/Two Story Residence: One story  Living Alone: Yes  Support Systems: Family member(s)  Patient Expects to be Discharged to[de-identified] Private residence  Current DME Used/Available at Home: None  Tub or Shower Type: (Pt reports he is in the process of having a handicap accessible bathroom)  [x]     Right hand dominant   []     Left hand dominant    Cognitive/Behavioral Status:  Neurologic State: Alert  Orientation Level: Oriented X4  Cognition: Follows commands  Safety/Judgement: Fall prevention    Skin: Visible skin appeared intact  Edema: None noted    Coordination: BUE  Coordination: Within functional limits  Fine Motor Skills-Upper: Left Intact; Right Intact    Gross Motor Skills-Upper: Left Intact; Right Intact    Balance:  Sitting: Intact  Standing: Intact    Strength: BUE  Strength: Within functional limits    Tone & Sensation: BUE  Tone: Normal  Sensation: Intact    Range of Motion: BUE  AROM: Within functional limits    Functional Mobility and Transfers for ADLs:  Bed Mobility:  Supine to Sit: Independent  Sit to Supine: Independent  Transfers:  Sit to Stand: Independent  Stand to Sit: independent   Toilet Transfer : Independent   Bathroom Mobility: Independent    ADL Assessment:  Feeding: Independent    Oral Facial Hygiene/Grooming: Independent    Bathing: Independent    Upper Body Dressing: Independent    Lower Body Dressing: Independent    Toileting: Independent      ADL Intervention:  Lower Body Dressing Assistance  Dressing Assistance: Independent  Socks: Independent  Position Performed: Seated edge of bed    Cognitive Retraining  Safety/Judgement: Fall prevention    Pain:  Pain level pre-treatment: 2/10 (B calves)  Pain level post-treatment: 2/10   Pain Intervention(s): Medication (see MAR); Rest, Ice, Repositioning  Response to intervention: Nurse notified, See doc flow    Activity Tolerance:   Good    Please refer to the flowsheet for vital signs taken during this treatment.   After treatment:   []  Patient left in no apparent distress sitting up in chair  [x]  Patient left in no apparent distress in bed  [x]  Call bell left within reach  [x]  Nursing notified  []  Caregiver present  []  Bed alarm activated    COMMUNICATION/EDUCATION:   [x]      Role of Occupational Therapy in the acute care setting  [x]      Home safety education was provided and the patient/caregiver indicated understanding. [x]      Patient/family have participated as able and agree with findings and recommendations. []      Patient is unable to participate in plan of care at this time. Thank you for this referral.  Humberto Esteves MS, OTR/L  Time Calculation: 8 mins      Eval Complexity: History: MEDIUM Complexity : Expanded review of history including physical, cognitive and psychosocial  history ; Examination: LOW Complexity : 1-3 performance deficits relating to physical, cognitive , or psychosocial skils that result in activity limitations and / or participation restrictions ;    Decision Making:LOW Complexity : No comorbidities that affect functional and no verbal or physical assistance needed to complete eval tasks

## 2020-02-05 NOTE — PROGRESS NOTES
PHYSICAL THERAPY EVALUATION AND DISCHARGE    Patient: Amanuel Pérez (44 y.o. male)  Date: 2/5/2020  Primary Diagnosis: Hyperkalemia [E87.5]  Seizure-like activity ([address] [R56.9]  CKD (chronic kidney disease) [N18.9]  Seizure ([address] [R56.9]        Precautions: seizures     PLOF: Pt reports he was independent with ADL's and functional mobility. He lives alone in single story home. ASSESSMENT :  Patient lying supine in bed and agreeable to PT evaluation. Based on the objective data described below, the patient presents with no significant functional impairments. He is independent with bed mobility and transfers. Supervision assist soley for management of IV pole. Patient does not require further skilled intervention at this level of care, therefore will be discharged from PT caseload. PLAN :  Recommendations and Planned Interventions:   No formal PT needs identified at this time. Discharge Recommendations: None  Further Equipment Recommendations for Discharge: N/A     SUBJECTIVE:   Patient stated Filiberto Rangel had a hard time walking when I first got here.     OBJECTIVE DATA SUMMARY:     Past Medical History:   Diagnosis Date    Chronic kidney disease     Hypertension     Sickle cell anemia (HCC)      Past Surgical History:   Procedure Laterality Date    HX ORTHOPAEDIC       Barriers to Learning/Limitations: None  Compensate with: N/A  Home Situation:   Home Situation  Home Environment: Private residence  # Steps to Enter: 1  One/Two Story Residence: One story  Living Alone: Yes  Support Systems: Family member(s)  Patient Expects to be Discharged to[de-identified] Private residence  Current DME Used/Available at Home: None       Strength:    Strength:  Within functional limits    Tone & Sensation:   Tone: Normal     Sensation: Intact    Range Of Motion:  AROM: Within functional limits    Functional Mobility:  Bed Mobility:  Supine to Sit: Independent  Sit to Supine: Independent     Transfers:  Sit to Stand: Independent  Stand to Sit: Independent     Balance:   Sitting: Intact  Standing: Intact    Ambulation/Gait Training:  Distance (ft): 100 Feet (ft)  Ambulation - Level of Assistance: Supervision(for management of IV pole)       Pain:  Pain level pre-treatment: 0/10   Pain level post-treatment: 0/10    Activity Tolerance:   Good  Please refer to the flowsheet for vital signs taken during this treatment. After treatment:   []         Patient left in no apparent distress sitting up in chair  [x]         Patient left in no apparent distress in bed  [x]         Call bell left within reach  [x]         Nursing notified  []         Caregiver present  []         Bed alarm activated  []         SCDs applied    COMMUNICATION/EDUCATION:   [x]         Role of Physical Therapy in the acute care setting. [x]         Fall prevention education was provided and the patient/caregiver indicated understanding. [x]         Patient/family have participated as able in goal setting and plan of care. [x]         Patient/family agree to work toward stated goals and plan of care. []         Patient understands intent and goals of therapy, but is neutral about his/her participation. []         Patient is unable to participate in goal setting/plan of care: ongoing with therapy staff.  []         Other:     Thank you for this referral.  Thee Summers, PT   Time Calculation: 10 mins      Eval Complexity: History: LOW Complexity : Zero comorbidities / personal factors that will impact the outcome / POCExam:LOW Complexity : 1-2 Standardized tests and measures addressing body structure, function, activity limitation and / or participation in recreation  Presentation: LOW Complexity : Stable, uncomplicated  Clinical Decision Making:Low Complexity    Overall Complexity:LOW

## 2020-02-05 NOTE — DISCHARGE INSTRUCTIONS
Patient Education     Today you saw the neurologist Dr. Fifi Escobar via the television who recommends seeing a neurologist in their office for follow-up. He recommends obtaining these tests: EEG and a cardiac EP guided MRI without contrast.  Please bring this paperwork with you to the neurologist office to guide treatment. Hypoglycemia: Care Instructions  Your Care Instructions    Hypoglycemia means that your blood sugar is low and your body is not getting enough fuel. Some people get low blood sugar from not eating often enough. Some medicines to treat diabetes can cause low blood sugar. People who have had surgery on their stomachs or intestines may get hypoglycemia. Problems with the pancreas, kidneys, or liver also can cause low blood sugar. A snack or drink with sugar in it will raise your blood sugar and should ease your symptoms right away. Your doctor may recommend that you change or stop your medicines until you can get your blood sugar levels under control. In the long run, you may need to change your diet and eating habits so that you get enough fuel for your body throughout the day. Follow-up care is a key part of your treatment and safety. Be sure to make and go to all appointments, and call your doctor if you are having problems. It's also a good idea to know your test results and keep a list of the medicines you take. How can you care for yourself at home? · Learn to recognize the early signs of low blood sugar. Signs include:  ? Nausea. ? Hunger. ? Feeling nervous, irritable, or shaky. ? Cold, clammy, wet skin. ? Sweating (when you are not exercising). ? A fast heartbeat.  ? Numbness or tingling of the fingertips or lips. · If you feel an episode of low blood sugar coming on, drink fruit juice or sugared (not diet) soda, or eat sugar in the form of candy, cubes, or tablets. [employer] are another [employer].   · Eat small, frequent meals so that you do not get too hungry between meals.  · Balance extra exercise with eating more. · Keep a written record of your low blood sugar episodes, including when you last ate and what you ate, so that you can learn what causes your blood sugar to drop. · Make sure your family, friends, and coworkers know the symptoms of low blood sugar and know what to do to get your sugar level up. · Wear medical alert jewelry that lists your condition. You can buy this at most drugstores. When should you call for help? Call 911 anytime you think you may need emergency care. For example, call if:    · You passed out (lost consciousness).     · You are confused or cannot think clearly.     · Your blood sugar is very high or very low.    Watch closely for changes in your health, and be sure to contact your doctor if:    · Your blood sugar stays outside the level your doctor set for you.     · You have any problems. Where can you learn more? Go to http://karen-catrachito.info/. Enter W722 in the search box to learn more about \"Hypoglycemia: Care Instructions. \"  Current as of: April 16, 2019  Content Version: 12.2  © 1227-4178 [employer]. Care instructions adapted under license by [employer] (which disclaims liability or warranty for this information). If you have questions about a medical condition or this instruction, always ask your healthcare professional. [place] [address] any warranty or liability for your use of this information. Patient Education        Seizure: Care Instructions  Your Care Instructions    Seizures are caused by abnormal patterns of electrical signals in the brain. They are different for each person. Seizures can affect movement, speech, vision, or awareness. Some people have only slight shaking of a hand and do not pass out. Other people may pass out and have violent shaking of the whole body. Some people appear to stare into space.  They are awake, but they can't respond normally. Later, they may not remember what happened. You may need tests to identify the type and cause of the seizures. A seizure may occur only once, or you may have them more than one time. Taking medicines as directed and following up with your doctor may help keep you from having more seizures. The doctor has checked you carefully, but problems can develop later. If you notice any problems or new symptoms, get medical treatment right away. Follow-up care is a key part of your treatment and safety. Be sure to make and go to all appointments, and call your doctor if you are having problems. It's also a good idea to know your test results and keep a list of the medicines you take. How can you care for yourself at home? · Be safe with medicines. Take your medicines exactly as prescribed. Call your doctor if you think you are having a problem with your medicine. · Do not do any activity that could be dangerous to you or others until your doctor says it is safe to do so. For example, do not drive a car, operate machinery, swim, or climb ladders. · Be sure that anyone treating you for any health problem knows that you have had a seizure and what medicines you are taking for it. · Identify and avoid things that may make you more likely to have a seizure. These may include lack of sleep, alcohol or drug use, stress, or not eating. · Make sure you go to your follow-up appointment. When should you call for help? Call 911 anytime you think you may need emergency care. For example, call if:    · You have another seizure.     · You have more than one seizure in 24 hours.     · You have new symptoms, such as trouble walking, speaking, or thinking clearly.    Call your doctor now or seek immediate medical care if:    · You are not acting normally.    Watch closely for changes in your health, and be sure to contact your doctor if you have any problems. Where can you learn more?   Go to http://karen-catrachito.info/. Enter K474 in the search box to learn more about \"Seizure: Care Instructions. \"  Current as of: 2019  Content Version: 12.2  © 3032-7455 [employer]. Care instructions adapted under license by [employer] (which disclaims liability or warranty for this information). If you have questions about a medical condition or this instruction, always ask your healthcare professional. [place] [address] any warranty or liability for your use of this information. Patient armband removed and shredded  MyChart Activation    Thank you for requesting access to [facility]. Please follow the instructions below to securely access and download your online medical record. [facility] allows you to send messages to your doctor, view your test results, renew your prescriptions, schedule appointments, and more. How Do I Sign Up? 1. In your internet browser, go to [email or web address]  2. Click on the First Time User? Click Here link in the Sign In box. You will be redirect to the New Member Sign Up page. 3. Enter your [facility] Access Code exactly as it appears below. You will not need to use this code after youve completed the sign-up process. If you do not sign up before the expiration date, you must request a new code. [facility] Access Code: [email or web address]  Expires: 3/19/2020  6:37 PM (This is the date your [facility] access code will )    4. Enter the last four digits of your Social Security Number (xxxx) and Date of Birth (mm/dd/yyyy) as indicated and click Submit. You will be taken to the next sign-up page. 5. Create a [facility] ID. This will be your [facility] login ID and cannot be changed, so think of one that is secure and easy to remember. 6. Create a [facility] password. You can change your password at any time. 7. Enter your Password Reset Question and Answer. This can be used at a later time if you forget your password. 8. Enter your e-mail address. You will receive e-mail notification when new information is available in [address] 9. Click Sign Up. You can now view and download portions of your medical record. 10. Click the Download Summary menu link to download a portable copy of your medical information. Additional Information    If you have questions, please visit the Frequently Asked Questions section of the [employer] website at [email or web address] Remember, [employer] is NOT to be used for urgent needs. For medical emergencies, dial 911. DISCHARGE SUMMARY from Nurse    PATIENT INSTRUCTIONS:    After general anesthesia or intravenous sedation, for 24 hours or while taking prescription Narcotics:  · Limit your activities  · Do not drive and operate hazardous machinery  · Do not make important personal or business decisions  · Do  not drink alcoholic beverages  · If you have not urinated within 8 hours after discharge, please contact your surgeon on call. Report the following to your surgeon:  · Excessive pain, swelling, redness or odor of or around the surgical area  · Temperature over 100.5  · Nausea and vomiting lasting longer than 4 hours or if unable to take medications  · Any signs of decreased circulation or nerve impairment to extremity: change in color, persistent  numbness, tingling, coldness or increase pain  · Any questions    What to do at Home:  Recommended activity: Activity as tolerated,     If you experience any of the following symptoms chest pains, shortness of breath,fever, chills, elevated temperature greater than 100.9 F, change in mental status, please follow up with nearest Emergency room. *  Please give a list of your current medications to your Primary Care Provider. *  Please update this list whenever your medications are discontinued, doses are      changed, or new medications (including over-the-counter products) are added.     *  Please carry medication information at all times in case of emergency situations. These are general instructions for a healthy lifestyle:    No smoking/ No tobacco products/ Avoid exposure to second hand smoke  Surgeon General's Warning:  Quitting smoking now greatly reduces serious risk to your health. Obesity, smoking, and sedentary lifestyle greatly increases your risk for illness    A healthy diet, regular physical exercise & weight monitoring are important for maintaining a healthy lifestyle    You may be retaining fluid if you have a history of heart failure or if you experience any of the following symptoms:  Weight gain of 3 pounds or more overnight or 5 pounds in a week, increased swelling in our hands or feet or shortness of breath while lying flat in bed. Please call your doctor as soon as you notice any of these symptoms; do not wait until your next office visit. The discharge information has been reviewed with the patient. The patient verbalized understanding. Discharge medications reviewed with the patient and appropriate educational materials and side effects teaching were provided.   ___________________________________________________________________________________________________________________________________

## 2020-02-05 NOTE — DISCHARGE SUMMARY
Discharge Summary    Patient: Earlene Mendez MRN: [id number]  CSN: [id number]    YOB: 1955  Age: 75 y.o. Sex: male    DOA: 2/3/2020 LOS:  LOS: 1 day   Discharge Date:      Admission Diagnoses: Hyperkalemia [E87.5]  Seizure-like activity ([facility] [address] [R56.9]  CKD (chronic kidney disease) [N18.9]  Seizure ([facility] [address] [R56.9]    Discharge Diagnoses:    Problem List as of 2/5/2020 Never Reviewed          Codes Class Noted - Resolved    Hyperkalemia ICD-10-CM: E87.5  ICD-9-CM: 276.7  2/4/2020 - Present        CKD (chronic kidney disease) ICD-10-CM: N18.9  ICD-9-CM: 585.9  2/4/2020 - Present        Seizure-like activity ([facility] [address] ICD-10-CM: R56.9  ICD-9-CM: 780.39  2/4/2020 - Present        Seizure ([facility] [address] ICD-10-CM: R56.9  ICD-9-CM: 780.39  2/4/2020 - Present              Discharge Condition: Stable    PHYSICAL EXAM  Visit Vitals  /84 (BP 1 Location: Right arm, BP Patient Position: Supine)   Pulse 66   Temp 98 °F (36.7 °C)   Resp 19   Ht 6' (1.829 m)   Wt 62.6 kg (138 lb)   SpO2 97%   BMI 18.72 kg/m²       General: Alert, cooperative, no acute distress    HEENT: NC, Atraumatic. PERRLA, EOMI. Anicteric sclerae. Lungs:  CTA Bilaterally. No Wheezing/Rhonchi/Rales. Heart:  Regular  rhythm,  No murmur, No Rubs, No Gallops  Abdomen: Soft, Non distended, Non tender.  +Bowel sounds, no HSM  Extremities: No c/c/e; + UE AVG + thrill, no inrudation  Psych:   Good insight. Not anxious or agitated. Neurologic:  CN 2-12 grossly intact, oriented X 3. No acute neurological                                 Deficits,     Hospital Course:   Possible seizure:  EEG with normal  Awake drowsy findings; No MRI as per neurology. No addition of antiepileptics at this time  Hyperkalemia:  Resolved; may need prn Kayexelate.  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 To resume bicarb, avoid nephrotoxic mediations, low K diet. Indeterminate troponin:  Elevated in setting of renal disease. ACS ruled out. asymptomatic  Chronic diastolic heart failure:  Currently compensated. With ACID- normal function noted on per review with [facility]; Echo with improved EF to 40%, from 20% last year. No ACEI/ARB due to renal disease, unless he started dialysis. Coreg increased to 6.325mg po bid  Hypertensive cardiomyopathy:  TTE findings as above. HTN:  On coreg- dose increased. norvasc added. Avoid ACE/ARB. Retarted on Lasix 80 mg daily   Sickle cell disease:  Iron level normal, at baseline  Vit D deficiency: will start replacement    Consults:   Cardiology:  Dr. Leydi Renteria  Nephrology:  Dr. Cuenca Oar  Neurology:  Dr. Jessica Kirk    Significant Diagnostic Studies:   Xr Chest Sngl V    Result Date: 2/4/2020  IMPRESSION:  No acute intrathoracic disease. Ct Head Wo Cont    Result Date: 2/3/2020  IMPRESSION: No acute findings. Nonspecific mild white matter abnormalities, often related to chronic microvascular disease. MRI would be more sensitive in detecting acute infarction. Mild ethmoid sinus disease. No results found for this visit on 02/03/20 (from the past 12 hour(s)). Results for Rula Marie (MRN [id number]) as of 2/5/2020 14:41   Ref.  Range 2/3/2020 18:25 2/5/2020 01:20   WBC Latest Ref Range: 4.6 - 13.2 K/uL 6.3 3.4 (L)   RBC Latest Ref Range: 4.70 - 5.50 M/uL 3.75 (L) 2.95 (L)   HGB Latest Ref Range: 13.0 - 16.0 g/dL 10.3 (L) 8.4 (L)   HCT Latest Ref Range: 36.0 - 48.0 % 31.2 (L) 24.3 (L)   MCV Latest Ref Range: 74.0 - 97.0 FL 83.2 82.4   MCH Latest Ref Range: 24.0 - 34.0 PG 27.5 28.5   MCHC Latest Ref Range: 31.0 - 37.0 g/dL 33.0 34.6   RDW Latest Ref Range: 11.6 - 14.5 % 12.5 12.3   PLATELET Latest Ref Range: 135 - 420 K/uL 165 124 (L)   MPV Latest Ref Range: 9.2 - 11.8 FL 9.6 9.2   NEUTROPHILS Latest Ref Range: 40 - 73 % 88 (H) 53 LYMPHOCYTES Latest Ref Range: 21 - 52 % 11 (L) 28   MONOCYTES Latest Ref Range: 3 - 10 % 1 (L) 15 (H)   EOSINOPHILS Latest Ref Range: 0 - 5 % 0 3   BASOPHILS Latest Ref Range: 0 - 2 % 0 1   Results for Ashleigh Brunner (MRN [id number]) as of 2/5/2020 14:41   Ref. Range 2/4/2020 17:55   Color Latest Units:   YELLOW   Appearance Latest Units:   CLEAR   Specific gravity Latest Ref Range: 1.005 - 1.030   1.012   pH (UA) Latest Ref Range: 5.0 - 8.0   5.0   Protein Latest Ref Range: NEG mg/dL 30 (A)   Glucose Latest Ref Range: NEG mg/dL NEGATIVE   Ketone Latest Ref Range: NEG mg/dL NEGATIVE   Blood Latest Ref Range: NEG   SMALL (A)   Bilirubin Latest Ref Range: NEG   NEGATIVE   Urobilinogen Latest Ref Range: 0.2 - 1.0 EU/dL 0.2   Nitrites Latest Ref Range: NEG   NEGATIVE   Leukocyte Esterase Latest Ref Range: NEG   NEGATIVE   Epithelial cells Latest Ref Range: 0 - 5 /lpf FEW   WBC Latest Ref Range: 0 - 4 /hpf 0 to 1   RBC Latest Ref Range: 0 - 5 /hpf 0 to 1   Bacteria Latest Ref Range: NEG /hpf NEGATIVE   Amorphous Crystals Latest Ref Range: NEG  1+ (A)     BMP:   Lab Results   Component Value Date/Time     02/05/2020 01:20 AM    K 4.3 02/05/2020 01:20 AM     (H) 02/05/2020 01:20 AM    CO2 20 (L) 02/05/2020 01:20 AM    AGAP 4 02/05/2020 01:20 AM     (H) 02/05/2020 01:20 AM    BUN 62 (H) 02/05/2020 01:20 AM    CREA 5.56 (H) 02/05/2020 01:20 AM    GFRAA 13 (L) 02/05/2020 01:20 AM    GFRNA 10 (L) 02/05/2020 01:20 AM    Results for Ashleigh Brunner (MRN [id number]) as of 2/5/2020 14:41   Ref.  Range 2/4/2020 11:30 2/4/2020 11:30 2/4/2020 11:34 2/4/2020 20:18   Triglyceride Latest Ref Range: <150 MG/      Cholesterol, total Latest Ref Range: <200 MG/      HDL Cholesterol Latest Ref Range: 40 - 60 MG/DL 65 (H)      CHOL/HDL Ratio Latest Ref Range: 0 - 5.0   1.7      VLDL, calculated Latest Units: MG/DL 27.6      LDL, calculated Latest Ref Range: 0 - 100 MG/DL 18.4      CK Latest Ref Range: 39 - 308 U/L 622 (H)   594 (H)   CK-MB Index Latest Ref Range: 0.0 - 4.0 % 1.0   0.9   CK - MB Latest Ref Range: <3.6 ng/ml 6.1 (H)   5.4 (H)   Troponin-I, Qt. Latest Ref Range: 0.0 - 0.045 NG/ML 0.19 (H)   0.17 (H)   Hemoglobin A1c, (calculated) Latest Ref Range: 4.2 - 5.6 %   <3.5 (L)    Iron Latest Ref Range: 50 - 175 ug/dL 202 (H)      TIBC Latest Ref Range: 250 - 450 ug/dL 243 (L)      Iron % saturation Latest Ref Range: 20 - 50 % 83 (H)      Ferritin Latest Ref Range: 8 - 388 NG/ML 54        2/4 TTE:   · Normal cavity size and wall thickness. Mild-to-moderate systolic dysfunction. Estimated left ventricular ejection fraction is 40 - 45%. Visually measured ejection fraction. Left ventricular global hypokinesis. Moderate (grade 2) left ventricular diastolic dysfunction. · Dilated right ventricle. Pacing wire present  · Mild to moderate aortic valve regurgitation is present. · Mild pulmonary hypertension. Pulmonary arterial systolic pressure is 40 mmHg. · Severely dilated left atrium. · Moderately elevated central venous pressure (10-15 mmHg); IVC diameter is larger than 21 mm and collapses more than 50% with respiration    Discharge Medications:     Current Discharge Medication List      START taking these medications    Details   sodium bicarbonate 650 mg tablet Take 1 Tab by mouth three (3) times daily. Indications: excess body acid  Qty: 90 Tab, Refills: 0      furosemide (LASIX) 40 mg tablet Take 1 Tab by mouth daily. Qty: 30 Tab, Refills: 0      carvediloL (COREG) 6.25 mg tablet Take 1 Tab by mouth two (2) times daily (with meals). Qty: 60 Tab, Refills: 0      calcium carbonate (OS-CORINA) 500 mg calcium (1,250 mg) tablet Take 1 Tab by mouth nightly. Qty: 30 Tab, Refills: 0      amLODIPine (NORVASC) 5 mg tablet Take 1 Tab by mouth daily.  Indications: high blood pressure  Qty: 30 Tab, Refills: 0             Activity: activity as tolerated    Diet: Cardiac Diet    Follow-up: with PCP, Nephrology and Cardiology in 7-10days    Minutes spent on discharge: >30 minutes spent coordinating this discharge (review instructions/follow-up, prescriptions, preparing report for sign off)    Dispo:  Home with self care and [facility] follow-up.

## 2020-02-05 NOTE — ROUTINE PROCESS
Bedside and Written shift change report given to Lenore Marti RN (oncoming nurse) by Van Workman RN (offgoing nurse). Report included the following information SBAR, Kardex, ED Summary and Recent Results  Report taken from ED Nurse. Kalyan Nova

## 2020-02-05 NOTE — CONSULTS
HPI: 76-year-old male who presented to our hospital yesterday after waking up with profound weakness. He had problems speaking, could not reach his phone. He reportedly was dozing off. His family eventually found him with difficulty moving and called EMS. He had a blood sugar of 42. He had a small tongue laceration presumably from biting it. He has no prior history of seizures and there were no witnessed seizures and no seizure type activity of any kind witnessed by anybody. He has recovered very well from his weakness on admission. He has a defibrillator, showing normal function, this was interrogated. Cardiology has evaluated the patient. He has no history of known cerebrovascular disease.       Social History     Socioeconomic History    Marital status: SINGLE     Spouse name: Not on file    Number of children: Not on file    Years of education: Not on file    Highest education level: Not on file   Occupational History    Not on file   Social Needs    Financial resource strain: Not on file    Food insecurity:     Worry: Not on file     Inability: Not on file    Transportation needs:     Medical: Not on file     Non-medical: Not on file   Tobacco Use    Smoking status: Current Every Day Smoker    Smokeless tobacco: Current User   Substance and Sexual Activity    Alcohol use: No    Drug use: Yes     Types: Marijuana    Sexual activity: Not on file   Lifestyle    Physical activity:     Days per week: Not on file     Minutes per session: Not on file    Stress: Not on file   Relationships    Social connections:     Talks on phone: Not on file     Gets together: Not on file     Attends Zoroastrian service: Not on file     Active member of club or organization: Not on file     Attends meetings of clubs or organizations: Not on file     Relationship status: Not on file    Intimate partner violence:     Fear of current or ex partner: Not on file     Emotionally abused: Not on file     Physically abused: Not on file     Forced sexual activity: Not on file   Other Topics Concern    Not on file   Social History Narrative    Not on file       History reviewed. No pertinent family history.     Current Facility-Administered Medications   Medication Dose Route Frequency Provider Last Rate Last Dose    furosemide (LASIX) tablet 80 mg  80 mg Oral DAILY Veronica Neely MD        calcium carbonate (OS-CORINA) tablet 500 mg [elemental]  500 mg Oral QHS Veronica Neely MD        naloxone (NARCAN) injection 0.4 mg  0.4 mg IntraVENous PRN Allison Sutton MD        docusate sodium (COLACE) capsule 100 mg  100 mg Oral BID Allison Sutton MD   100 mg at 02/05/20 0854    glucose chewable tablet 16 g  16 g Oral PRN Allison Sutton MD        glucagon (GLUCAGEN) injection 1 mg  1 mg IntraMUSCular PRN Allison Sutton MD        dextrose (D50W) injection syrg 25 g  50 mL IntraVENous PRN Allison Sutton MD        LORazepam (ATIVAN) injection 1 mg  1 mg IntraVENous Q4H PRN Allison Sutton MD        carvediloL (COREG) tablet 3.125 mg  3.125 mg Oral BID WITH MEALS Clovis Gilbert MD   3.125 mg at 02/05/20 0855    hydrALAZINE (APRESOLINE) 20 mg/mL injection 10 mg  10 mg IntraVENous Q6H PRN Lynsey Gilbert MD        famotidine (PEPCID) tablet 20 mg  20 mg Oral DAILY Allison Sutton MD   20 mg at 02/05/20 0854    sodium bicarbonate tablet 650 mg  650 mg Oral TID Alana Lopez MD   650 mg at 02/05/20 0855    amLODIPine (NORVASC) tablet 5 mg  5 mg Oral DAILY Alana Lopez MD   5 mg at 02/05/20 7340       Past Medical History:   Diagnosis Date    Chronic kidney disease     Hypertension     Sickle cell anemia (HCC)        Past Surgical History:   Procedure Laterality Date    HX ORTHOPAEDIC         Allergies   Allergen Reactions    Aspirin Other (comments)    Quinine Other (comments)    Sulfa (Sulfonamide Antibiotics) Other (comments)    Tylenol [Acetaminophen] Not Reported This Time Patient Active Problem List   Diagnosis Code    Hyperkalemia E87.5    CKD (chronic kidney disease) N18.9    Seizure-like activity ([address] R56.9    Seizure ([address] R56.9         Review of Systems:   Constitutional: no fever or chills  Skin denies rash or itching  HEENT:  Denies tinnitus, hearing loss, or visual changes  Respiratory: denies shortness of breath  Cardiovascular: denies chest pain, dyspnea on exertion  Gastrointestinal: does not report nausea or vomiting  Genitourinary: does not report dysuria or incontinence  Musculoskeletal: does not report joint pain or swelling  Endocrine: denies weight change  Hematology: denies easy bruising or bleeding   Neurological: as above in HPI      PHYSICAL EXAMINATION:         Vital signs:    Visit Vitals  /78 (BP 1 Location: Left arm, BP Patient Position: Supine)   Pulse 65   Temp 98.3 °F (36.8 °C)   Resp 19   Ht 6' (1.829 m)   Wt 62.6 kg (138 lb)   SpO2 97%   BMI 18.72 kg/m²         GENERAL:                  Well developed, thin, in no apparent distress. HEART:                       RR, no murmurs  EXTREMITIES:           No edema is identified. Pulses are +2. HEAD:                         Normocephalic, atraumatic. NEUROLOGIC EXAMINATION       MENTAL STATUS:     Awake, alert, and oriented x 4. Attention and STM are grossly normal. There is no aphasia. Fund of knowledge is adequate. Mood and affect are appropriate  CRANIAL NERVES:   Visual fields are full to confrontation. Pupils are reactive to light and accommodation. Unable to see fundi at bedside  Extraocular movements are intact and there is no nystagmus. Facial sensation is normal  Face is symmetrical.   Hearing is present. SCM/TPZ 5/5  Tongue protrudes midline, palate elevates symmetrically. MOTOR:          5/5 strength throughout, normal tone.      CEREBELLAR:           No tremors   SENSORY:     Normal distal pinprick, proprioception, and vibration. Romberg not tested. DTR's:                         +1 throughout, no long tract signs                 GAIT:                            Deferred    I have personally reviewed imaging studies. A CT of the head done on February 3 showed mild white matter microangiopathy, no acute changes. An MRI has been ordered. An EEG was personally reviewed, normal awake, drowsy, and asleep    Impression: He may have had a new onset seizure, based on the fact he woke up unable to move, with slurred speech, and had a tongue laceration, but there is no corroboration of this and he had a clear provoking factor in the form of hypoglycemia with a blood sugar of 42. Plan: 1No need for further neurological work-up for this presumably provoked seizure, if he indeed had a seizure. Therefore we will go ahead and cancel the MRI which will not change his management and will require coordination with his defibrillator which is not really necessary. 2No need for antiepileptic medications or further follow-up with neurology. Patient appears back to baseline and may be discharged home from the neuro perspective with a plan to avoid hypoglycemia and work-up regarding the causes. PLEASE NOTE:   Portions of this document may have been produced using voice recognition software. Unrecognized errors in transcription may be present. This note will not be viewable in [address]

## 2020-02-05 NOTE — PROGRESS NOTES
MRI Safety Screening form needs to be filled out and FAXED to [address] MRI can be scheduled. If unable to acquire information from patient  MPOA must be contacted, or screening xrays will need to be ordered.     IF pt is Claustrophobic or will need Pain Meds please have these ordered in advance to help facilitate MRI exam.

## 2020-02-05 NOTE — CDMP QUERY
Pt admitted with seizure, noted documentation of cardiomyopathy by cardiology. If possible, please document in progress notes and discharge summary further specificity regarding the type of cardiomyopathy: ? Dilated cardiomyopathy ? Hypertensive cardiomyopathy ? Hypertrophic cardiomyopathy ? Idiopathic cardiomyopathy ? Ischemic cardiomyopathy ? Obstructive cardiomyopathy ? Restrictive cardiomyopathy 
? Other, please specify ? Clinically unable to determine The medical record reflects the following: 
   Risk Factors: CHF, HTN, CKD, a-fib Clinical Indicators: per cardiology: Echo with improved EF to 40%, from 20% last year Treatment: has AICD, receiving Lasix, Apresoline, Coreg, Norvasc Thank you, [address] [facility], [facility] 
[phone]

## 2020-02-05 NOTE — PROGRESS NOTES
OT order received and chart reviewed. Patient currently has an active bedrest order. Please discontinue bedrest order for full participation in skilled OT evaluation/treatment. Will follow up as patient schedule allows.     Thank you for the referral.    Ernesto Leong MS, OTR/L

## 2020-02-05 NOTE — PROGRESS NOTES
RENAL DAILY PROGRESS NOTE    Subjective:   Admitted for \"SZ? Seen for hyperkalemia and CKD stage 5    Complaint: feels better, no CP or SOB, appetite good. Additional info hx of Hep C post treatment claims he is cured. Current Facility-Administered Medications   Medication Dose Route Frequency    furosemide (LASIX) tablet 80 mg  80 mg Oral DAILY    calcium carbonate (OS-CORINA) tablet 500 mg [elemental]  500 mg Oral QHS    naloxone (NARCAN) injection 0.4 mg  0.4 mg IntraVENous PRN    docusate sodium (COLACE) capsule 100 mg  100 mg Oral BID    glucose chewable tablet 16 g  16 g Oral PRN    glucagon (GLUCAGEN) injection 1 mg  1 mg IntraMUSCular PRN    dextrose (D50W) injection syrg 25 g  50 mL IntraVENous PRN    LORazepam (ATIVAN) injection 1 mg  1 mg IntraVENous Q4H PRN    carvediloL (COREG) tablet 3.125 mg  3.125 mg Oral BID WITH MEALS    hydrALAZINE (APRESOLINE) 20 mg/mL injection 10 mg  10 mg IntraVENous Q6H PRN    famotidine (PEPCID) tablet 20 mg  20 mg Oral DAILY    sodium bicarbonate tablet 650 mg  650 mg Oral TID    amLODIPine (NORVASC) tablet 5 mg  5 mg Oral DAILY           Objective:     Patient Vitals for the past 24 hrs:   Temp Pulse Resp BP SpO2   02/05/20 0750 98.3 °F (36.8 °C) 65 19 161/78 97 %   02/05/20 0422 98 °F (36.7 °C) 70 18 136/71 94 %   02/05/20 0009 98.7 °F (37.1 °C) 63 19 143/69 97 %   02/04/20 2020 99.5 °F (37.5 °C) 83 17 163/78 100 %   02/04/20 1830  61 16 133/55 98 %   02/04/20 1800  64 18 165/71 98 %   02/04/20 1730  62 15 158/71 99 %   02/04/20 1700  61 15 166/71 100 %   02/04/20 1638  62  166/72    02/04/20 1630  61 16 166/72 99 %   02/04/20 1600  64 18 162/79 99 %   02/04/20 1530  69 17 167/87 99 %   02/04/20 1100  62 14 157/76 96 %   02/04/20 1038  62  158/71         Weight change: 0 kg (0 lb)     02/03 1901 - 02/05 0700  In: 1241.3 [P.O.:240;  I.V.:1001.3]  Out: 450 [Urine:450]    Intake/Output Summary (Last 24 hours) at 2/5/2020 1004  Last data filed at 2/5/2020 0753  Gross per 24 hour   Intake 1241.33 ml   Output 650 ml   Net 591.33 ml     Physical Exam: alert, oriented x 3, afebrile    HEENT: non icteric  Neck: No JVD  Cardiovascular: regular no rub, AICD  C/L: clear ant/lat  Abdomen: non tender  Ext: Left arm AVF + bruit, No LE edema    Data Review:     LABS:   Hematology:   Recent Labs     02/05/20  0120 02/03/20  1825   WBC 3.4* 6.3   HGB 8.4* 10.3*   HCT 24.3* 31.2*   pl 124K  Chemistry:   Recent Labs     02/05/20  0120 02/04/20  1130 02/04/20  0057 02/03/20  1825   BUN 62* 66* 67* 60*   CREA 5.56* 5.89* 5.81* 5.95*   CA 7.5* 8.3*  8.2* 7.9* 7.7*   ALB  --   --   --  3.7   K 4.3 5.0 5.6* 5.8*    143 142 141   * 117* 117* 116*   CO2 20* 18* 15* 13*   PHOS 2.7  --   --   --    * 110* 79 48*    IPTH 386  Fe 202 Sat 83% Leobardo 54  U/A + protein 30 mg/dl, neg blood no RBC or WBC  Hep B surface Ag neg  Vit D 25 OH pend    IMAGES: ECHO  Result status: Final result   · Normal cavity size and wall thickness. Mild-to-moderate systolic dysfunction. Estimated left ventricular ejection fraction is 40 - 45%. Visually measured ejection fraction. Left ventricular global hypokinesis. Moderate (grade 2) left ventricular diastolic dysfunction. · Dilated right ventricle. Pacing wire present  · Mild to moderate aortic valve regurgitation is present. · Mild pulmonary hypertension. Pulmonary arterial systolic pressure is 40 mmHg. · Severely dilated left atrium. · Moderately elevated central venous pressure (10-15 mmHg); IVC diameter is larger than 21 mm and collapses more than 50% with respiration. IMPRESSION AND PLAN:   CKD stage 5 renal function stable, metabolic acidosis and hyperkalemia have improved. No acute need to start HD. Defer to his nephrolog[facility] at the [facility]. Recommend to cont sodium bicarb and low K diet. HTN still above goal. Resume lasix 80 mg a day, titrate Norvasc as tolerated to get to goal BP.   Anemia good iron stores, Consider LIZZY, he will talk to the [facility] neph[facility]   SHPT from CKD and or Vit D def. Check Vit D 25 OH.  Start Calcium supp, phos normal. Consider Vit D3 or active Vit D supplement         Seble Mendoza MD  2/5/2020

## 2020-02-05 NOTE — PROGRESS NOTES
Reason for Admission:  Hyperkalemia [E87.5]  Seizure-like activity ([address] [R56.9]  CKD (chronic kidney disease) [N18.9]  Seizure ([address] [R56.9]                 RRAT Score:    15             Plan for utilizing home health:    No                       Likelihood of Readmission:   LOW                         Transition of Care Plan:              Initial assessment completed with patient. Cognitive status of patient: oriented to time, place, person and situation. Face sheet information confirmed:  yes. The patient designates his daughter Kamryn Boone [phone] and his Daune Spencerke [phone] to participate in his discharge plan and to receive any needed information. This patient lives in a home. Patient is able to navigate steps as needed. Prior to hospitalization, patient was considered to be independent with ADLs/IADLS : yes  Patient has a current ACP document on file: no  The daughter will be available to transport patient home upon discharge. The patient already has no medical equipment available in the home. Patient is not currently active with home health. Patient has not stayed in a skilled nursing facility or rehab. Was  stay within last 60 days : no. This patient is on dialysis :no  Currently, the discharge plan is Home. The patient states that he can obtain his medications from the pharmacy, and take his medications as directed. Patient's current insurance is [facility] Medicare       Care Management Interventions  PCP Verified by CM: No(per pt, his pcp is with the [place] and he saw pcp a month ago)  Palliative Care Criteria Met (RRAT>21 & CHF Dx)?: No  Mode of Transport at Discharge:  Other (see comment)(family)  Transition of Care Consult (CM Consult): Discharge Planning  Physical Therapy Consult: Yes  Occupational Therapy Consult: Yes  Speech Therapy Consult: No  Current Support Network: Lives Alone, Family Lives Nearby  Confirm Follow Up Transport: Self  [place] Resource Information Provided?: Yes  Discharge Location  Discharge Placement: Home      DANIEL Garcia RN  Care Management  Pager: [phone]

## 2020-02-05 NOTE — PROGRESS NOTES
Problem: Falls - Risk of  Goal: *Absence of Falls  Description  Document Tia Manus Fall Risk and appropriate interventions in the flowsheet.   Outcome: Progressing Towards Goal  Note: Fall Risk Interventions:            Medication Interventions: Bed/chair exit alarm, Patient to call before getting OOB, Teach patient to arise slowly                   Problem: Patient Education: Go to Patient Education Activity  Goal: Patient/Family Education  Outcome: Progressing Towards Goal     Problem: Seizure Disorder (Adult)  Goal: *STG: Remains free of seizure activity  Outcome: Progressing Towards Goal  Goal: *STG: Maintains lab values within therapeutic range  Outcome: Progressing Towards Goal  Goal: *STG/LTG: Complies with medication therapy  Outcome: Progressing Towards Goal  Goal: *STG: Remains free of injury during seizure activity  Outcome: Progressing Towards Goal  Goal: *STG: Remains safe in hospital  Outcome: Progressing Towards Goal  Goal: Interventions  Outcome: Progressing Towards Goal     Problem: Patient Education: Go to Patient Education Activity  Goal: Patient/Family Education  Outcome: Progressing Towards Goal     Problem: Chronic Renal Failure  Goal: *Fluid and electrolytes stabilized  Outcome: Progressing Towards Goal     Problem: Patient Education: Go to Patient Education Activity  Goal: Patient/Family Education  Outcome: Progressing Towards Goal     Problem: Injury - Risk of, Adverse Drug Event  Goal: *Absence of adverse drug events  Outcome: Progressing Towards Goal  Goal: *Absence of medication errors  Outcome: Progressing Towards Goal  Goal: *Knowledge of prescribed medications  Outcome: Progressing Towards Goal     Problem: Patient Education: Go to Patient Education Activity  Goal: Patient/Family Education  Outcome: Progressing Towards Goal

## 2020-02-05 NOTE — CDMP QUERY
Pt admitted with seizure and has history of CHF documented. Please further specify type and acuity of CHF in the medical record. ? Chronic Systolic CHF 
? Other, please specify ? Clinically unable to determine The medical record reflects the following: 
   Risk Factors: cardiomyopathy, CHF, HTN, CKD, afib Clinical Indicators: per cardiology: Echo with improved EF to 40%, from 20% last year Treatment: has AICD, receiving Lasix, Apresoline, Coreg, Norvasc Thank you, [address] [facility], [facility] 
[phone]

## 2020-02-05 NOTE — ROUTINE PROCESS
Bedside and Verbal shift change report given to PROSPER Rain (oncoming nurse) by Lakeisha Ramires RN 
 (offgoing nurse). Report included the following information SBAR, Kardex, Intake/Output, MAR and Recent Results.

## 2020-02-05 NOTE — PROCEDURES
[address] [place] [address]   EEG    Name:  Maame Mullen  MR#:   [id number]  :  1955  ACCOUNT #:  [de-identified]  DATE OF SERVICE:  2020    REFERRING PROVIDER:  María Harrison MD    EEG Number:  Lenord Redo. CLINICAL HISTORY:  A 53-year-old male with episode of weakness and inability to speak. MEDICATIONS:  Amlodipine, carvedilol, Pepcid, sodium bicarbonate. EEG REPORT:  This is a 16-channel routine EEG done using the international 10-20 electrode placement system. The predominant background consists of 8-9 Hz posterior predominant symmetric, regular activity, which attenuates with eye opening. No abnormal photoparoxysmal responses were observed. Hyperventilation could not be performed. Secondary rhythms consisting of central vertex waves with K-complex and sleep spindles consistent with stage II sleep were observed. No epileptiform abnormalities are seen.     IMPRESSION:  This is a normal awake, drowsy, and asleep electroencephalogram.      Jeanette Thomas MD      [id number]  D:  2020 13:34  T:  2020 19:59  JOB #:  [id number]

## 2020-02-05 NOTE — PROGRESS NOTES
Discharge instructions given verbally and written all questions answered IV, tele, and armband discontinued, prescriptions given to be filled at patient's local pharmacy family at bedside to transport patient home via car    As part of the discharge instructions, medications already given today were discussed with the patient. The next dose due of all ordered meds was highlighted as part of the medication discharge instructions. Discussed with the patient the importance of taking medications as directed, as well as the side effects and adverse reactions to medications ordered.

## 2020-04-15 ENCOUNTER — HOSPITAL ENCOUNTER (OUTPATIENT)
Age: 65
Setting detail: OUTPATIENT SURGERY
Discharge: HOME | End: 2020-04-15
Attending: SURGERY | Admitting: SURGERY
Payer: MEDICARE

## 2020-04-15 VITALS
WEIGHT: 142 LBS | SYSTOLIC BLOOD PRESSURE: 148 MMHG | HEIGHT: 72 IN | OXYGEN SATURATION: 99 % | HEART RATE: 60 BPM | BODY MASS INDEX: 19.23 KG/M2 | DIASTOLIC BLOOD PRESSURE: 83 MMHG | RESPIRATION RATE: 25 BRPM

## 2020-04-15 DIAGNOSIS — T82.9XXA COMPLICATION OF DIALYSIS ACCESS INSERTION: ICD-10-CM

## 2020-04-15 DIAGNOSIS — N18.6 ESRD (END STAGE RENAL DISEASE) (HCC): ICD-10-CM

## 2020-04-15 LAB
ANION GAP SERPL CALC-SCNC: 6 MMOL/L (ref 3–18)
BUN BLD-MCNC: 45 MG/DL (ref 7–18)
BUN SERPL-MCNC: 49 MG/DL (ref 7–18)
BUN/CREAT SERPL: 9 (ref 12–20)
CALCIUM SERPL-MCNC: 7.8 MG/DL (ref 8.5–10.1)
CHLORIDE BLD-SCNC: 118 MMOL/L (ref 100–108)
CHLORIDE SERPL-SCNC: 116 MMOL/L (ref 100–111)
CO2 SERPL-SCNC: 19 MMOL/L (ref 21–32)
CREAT SERPL-MCNC: 5.7 MG/DL (ref 0.6–1.3)
GLUCOSE BLD STRIP.AUTO-MCNC: 82 MG/DL (ref 74–106)
GLUCOSE SERPL-MCNC: 84 MG/DL (ref 74–99)
HCT VFR BLD CALC: 35 % (ref 36–49)
HGB BLD-MCNC: 11.9 G/DL (ref 12–16)
POTASSIUM BLD-SCNC: 6.1 MMOL/L (ref 3.5–5.5)
POTASSIUM SERPL-SCNC: 6 MMOL/L (ref 3.5–5.5)
SODIUM BLD-SCNC: 141 MMOL/L (ref 136–145)
SODIUM SERPL-SCNC: 141 MMOL/L (ref 136–145)

## 2020-04-15 PROCEDURE — C1725 CATH, TRANSLUMIN NON-LASER: HCPCS | Performed by: SURGERY

## 2020-04-15 PROCEDURE — 77030002916 HC SUT ETHLN J&J -A: Performed by: SURGERY

## 2020-04-15 PROCEDURE — 36902 INTRO CATH DIALYSIS CIRCUIT: CPT | Performed by: SURGERY

## 2020-04-15 PROCEDURE — C1769 GUIDE WIRE: HCPCS | Performed by: SURGERY

## 2020-04-15 PROCEDURE — 82435 ASSAY OF BLOOD CHLORIDE: CPT

## 2020-04-15 PROCEDURE — 74011250636 HC RX REV CODE- 250/636: Performed by: SURGERY

## 2020-04-15 PROCEDURE — C1894 INTRO/SHEATH, NON-LASER: HCPCS | Performed by: SURGERY

## 2020-04-15 PROCEDURE — 74011000250 HC RX REV CODE- 250: Performed by: SURGERY

## 2020-04-15 PROCEDURE — 77030013744: Performed by: SURGERY

## 2020-04-15 PROCEDURE — 36415 COLL VENOUS BLD VENIPUNCTURE: CPT

## 2020-04-15 PROCEDURE — 80048 BASIC METABOLIC PNL TOTAL CA: CPT

## 2020-04-15 PROCEDURE — 74011636320 HC RX REV CODE- 636/320: Performed by: SURGERY

## 2020-04-15 RX ORDER — PANTOPRAZOLE SODIUM 40 MG/1
40 TABLET, DELAYED RELEASE ORAL DAILY
COMMUNITY

## 2020-04-15 RX ORDER — MORPHINE SULFATE 2 MG/ML
INJECTION, SOLUTION INTRAMUSCULAR; INTRAVENOUS AS NEEDED
Status: DISCONTINUED | OUTPATIENT
Start: 2020-04-15 | End: 2020-04-15 | Stop reason: HOSPADM

## 2020-04-15 RX ORDER — LIDOCAINE HYDROCHLORIDE 10 MG/ML
INJECTION, SOLUTION EPIDURAL; INFILTRATION; INTRACAUDAL; PERINEURAL AS NEEDED
Status: DISCONTINUED | OUTPATIENT
Start: 2020-04-15 | End: 2020-04-15 | Stop reason: HOSPADM

## 2020-04-15 RX ORDER — TRAZODONE HYDROCHLORIDE 50 MG/1
50 TABLET ORAL
COMMUNITY

## 2020-04-15 RX ORDER — ASCORBIC ACID 500 MG
500 TABLET ORAL DAILY
COMMUNITY

## 2020-04-15 RX ORDER — LANOLIN ALCOHOL/MO/W.PET/CERES
325 CREAM (GRAM) TOPICAL 2 TIMES DAILY
COMMUNITY

## 2020-04-15 RX ORDER — CALCIUM ACETATE 667 MG/1
1 CAPSULE ORAL
COMMUNITY

## 2020-04-15 RX ORDER — ALBUTEROL SULFATE 90 UG/1
1 AEROSOL, METERED RESPIRATORY (INHALATION)
COMMUNITY

## 2020-04-15 NOTE — Clinical Note
Sheath #1: Sheath: inserted. Sheath inserted/placed in the left brachiocephalic  vein. Hemostasis achieved.

## 2020-04-15 NOTE — OP NOTES
Preoperative diagnosis: End-stage renal disease    Postoperative diagnosis: Same    Procedures performed:  #1  Left arm fistulogram with radiographic interpretation, reflux arteriogram with radiographic interpretation  #2  Balloon angioplasty of left arm fistula with angiogram and interpretation  #3    #4      Cultures: None    Specimens: None    Drains: None    Estimated blood loss: Less than 50 mL    Assistants: None    Implants: Please see above    Complications: None    Anesthesia: Moderate conscious sedation     Indications for the procedure: Cat Gabriel is a 75 y.o. showed up today's report from his dialysis coordinator concern for stenosis in the left arm fistula. Patient was given the appropriate risk and benefits of the procedure including but not limited to bleeding, infection, damage to adjacent structures, MI, stroke, death, loss of lower extremity, need for further surgery. Patient was understanding of all the risks and underwent a procedure. Operative findings:   #1  Left brachial artery is patent with appropriate bifurcation, inflow to the fistula is patent. At the mid fistula there is appears to be a valve with at least a moderate narrowing. Beyond this the basilic vein runs off to the deep veins are all completely patent without deficit. Successful balloon angioplasty of the lesion with a 740 balloon    Procedure:  Patient was correctly identified in the precath area and taken to the Cath Lab in stable condition. Patient had pre-incision timeout prior to any incision. Patient was prepped and draped in the normal sterile fashion according to CDC guidelines aseptic technique. Localized and accessed the fistula with a 19 needle placed a wire and a 4 Bengali sheath. Compress the fistula reflux arteriogram.  Cross this lesion with a V 18 wire and ballooned with a 740 balloon to a pressure of 10. Post angioplasty site. Improved.   Remove the wire and sheath repaired the site with a 3-0 Ethilon.   There was no bleeding there is good hemostasis patient is transferred to recovery

## 2020-04-15 NOTE — Clinical Note
TRANSFER - IN REPORT:     Verbal report received from: Petty Parikh RN. Report consisted of patient's Situation, Background, Assessment and   Recommendations(SBAR). Opportunity for questions and clarification was provided. Assessment completed upon patient's arrival to unit and care assumed. Patient transported with a Cardiac Cath Tech / Patient Care Tech. Anxious

## 2020-04-15 NOTE — H&P
Surgery History and Physical    Subjective: Tiago Ott is a 75 y.o.  male who presents with difficulties with function of his avf and fistulogram is requested. Patient Active Problem List    Diagnosis Date Noted    Hyperkalemia 02/04/2020    CKD (chronic kidney disease) 02/04/2020    Seizure-like activity ([facility] [address] 02/04/2020    Seizure ([facility] [address] 02/04/2020     Past Medical History:   Diagnosis Date    Chronic kidney disease     Hypertension     Sickle cell anemia (HCC)       Past Surgical History:   Procedure Laterality Date    HX ORTHOPAEDIC        Social History     Tobacco Use    Smoking status: Current Every Day Smoker    Smokeless tobacco: Current User   Substance Use Topics    Alcohol use: No      History reviewed. No pertinent family history. Prior to Admission medications    Medication Sig Start Date End Date Taking? Authorizing Provider   sodium bicarbonate 650 mg tablet Take 1 Tab by mouth three (3) times daily. Indications: excess body acid 2/5/20   Jorge A Cosme MD   carvediloL (COREG) 6.25 mg tablet Take 1 Tab by mouth two (2) times daily (with meals). 2/5/20   Jorge A Cosme MD   calcium carbonate (OS-CORINA) 500 mg calcium (1,250 mg) tablet Take 1 Tab by mouth nightly. 2/5/20   Jorge A Cosme MD   amLODIPine (NORVASC) 5 mg tablet Take 1 Tab by mouth daily. Indications: high blood pressure 2/6/20   Jorge A Cosme MD   furosemide (LASIX) 40 mg tablet Take 2 Tabs by mouth daily. 2/5/20   Jorge A Cosme MD     Allergies   Allergen Reactions    Aspirin Other (comments)    Quinine Other (comments)    Sulfa (Sulfonamide Antibiotics) Other (comments)    Tylenol [Acetaminophen] Not Reported This Time         Review of Systems:    Pertinent items are noted in the History of Present Illness. Objective:     Patient Vitals for the past 8 hrs:   Height Weight   04/15/20 1056 6' (1.829 m) 142 lb (64.4 kg)       No data recorded.       Physical Exam:  GENERAL: alert, cooperative, no distress, appears stated age, LUNG: clear to auscultation bilaterally, HEART: regular rate and rhythm, S1, S2 normal, no murmur, click, rub or gallop, EXTREMITIES:  Left arm avf      Assessment:     ESRD    Plan:     Left arm fistulogram     Signed By: LAINE Burris     April 15, 2020

## 2020-04-15 NOTE — Clinical Note
Contrast Dose Calculator:   Patient's age: 75.   Patient's sex: Male. Patient weight (kg) = 64.4. Creatinine level (mg/dL) = 5.7. Creatinine clearance (mL/min): 12.   Contrast concentration (mg/mL) = 300. MACD = 56.49 mL. Max Contrast dose per Creatinine Cl calculator = 27 mL.

## 2020-04-15 NOTE — PROGRESS NOTES
Cath holding summary     Patient escorted to cath holding from waiting area ambulatory, alert and oriented x 4, voicing no complaints. Changed into gown and placed on monitor. NPO since [place]. Lab results, med rec and H&P reviewed on chart. PIV x 1 inserted without difficulty. 1450  TRANSFER - OUT REPORT:    Verbal report given to Jassi(name) on [facility].  being transferred to cath lab(unit) for ordered procedure       Report consisted of patients Situation, Background, Assessment and   Recommendations(SBAR). Information from the following report(s) SBAR, MAR and Pre Procedure Checklist was reviewed with the receiving nurse. Lines:   Peripheral IV 04/15/20 Posterior;Proximal;Right Forearm (Active)   Site Assessment Clean, dry, & intact 4/15/2020 11:20 AM   Phlebitis Assessment 0 4/15/2020 11:20 AM   Infiltration Assessment 0 4/15/2020 11:20 AM   Dressing Status Clean, dry, & intact 4/15/2020 11:20 AM   Dressing Type Transparent 4/15/2020 11:20 AM   Hub Color/Line Status Blue;Flushed 4/15/2020 11:20 AM        Opportunity for questions and clarification was provided. Patient transported with:   [id number] REPORT:    Verbal report received from Jassi(name) on [facility].  being received from cath lab(unit) for routine post - op      Report consisted of patients Situation, Background, Assessment and   Recommendations(SBAR). Information from the following report(s) SBAR, Procedure Summary and MAR was reviewed with the receiving nurse. Opportunity for questions and clarification was provided. Assessment completed upon patients arrival to unit and care assumed. Catheter entry site with bandaid, clean dry and intact, without hematoma. 1600  Pt provided discharge instructions. All questions answered and pt verbalized understanding. PIV removed. No hematoma or bleeding noted at this time. Procedure site within normal limits.  Pt escorted to front of building for discharge.

## 2020-04-15 NOTE — DISCHARGE INSTRUCTIONS
Patient Education        Hemodialysis Access: What to Expect at [facility]  Hemodialysis is a way to remove wastes from the blood when your kidneys can no longer do the job. It is not a cure, but it can help you live longer and feel better. It is a lifesaving treatment when you have kidney failure. Hemodialysis is often called dialysis. Your doctor created a place (called an access) in your arm for your blood to flow in and out of your body during your dialysis sessions. Your arm will probably be bruised and swollen. It may hurt. The cut (incision) may bleed. The pain and bleeding will get better over several days. You will probably need only over-the-counter pain medicine. You can reduce swelling by propping your arm on 1 or 2 pillows and keeping your elbow straight. You will have stitches. These may dissolve on their own, or your doctor will tell you when to come in to have them removed. You should also be able to return to work in a few days. You may feel some coolness or numbness in your hand. These feelings usually go away in a few weeks. Your doctor may suggest squeezing a soft object. This will strengthen your access and may make hemodialysis faster and easier. You should always be able to feel blood rushing through the fistula or graft. It feels like a slight vibration when you put your fingers on the skin over the fistula or graft. This feeling is called a thrill or pulse. This care sheet gives you a general idea about how long it will take for you to recover. But each person recovers at a different pace. Follow the steps below to get better as quickly as possible. How can you care for yourself at home? Activity    · Rest when you feel tired. Getting enough sleep will help you recover.  Do not lie on or sleep on the arm with the access.     · Avoid activities such as washing windows or gardening that put stress on the arm with the access.     · You may use your arm, but do not lift anything that weighs more than about 15 pounds. This may include a child, heavy grocery bags, a heavy briefcase or backpack, cat litter or dog food bags, or a vacuum .     · You can shower, but keep the access dry for the first 2 days. Cover the area with a plastic bag to keep it dry.     · Do not soak or scrub the incision until it has healed.     · Wear an arm guard to protect the area if you play sports or work with your arms.     · You may drive when your doctor says it is okay. This is usually in 1 to 2 days.     · Most people are able to return to work about 1 or 2 days after surgery. Diet    · Follow an eating plan that is good for your kidneys. A registered dietitian can help you make a meal plan that is right for you. You may need to limit protein, salt, fluids, and certain foods. Medicines    · Your doctor will tell you if and when you can restart your medicines. He or she will also give you instructions about taking any new medicines.     · If you take aspirin or some other blood thinner, ask your doctor if and when to start taking it again. Make sure that you understand exactly what your doctor wants you to do.     · Take pain medicines exactly as directed. ? If the doctor gave you a prescription medicine for pain, take it as prescribed. ? If you are not taking a prescription pain medicine, ask your doctor if you can take acetaminophen (Tylenol). Do not take ibuprofen (Advil, Motrin) or naproxen (Aleve), or similar medicines, unless your doctor tells you to. They may make chronic kidney disease worse. ? Do not take two or more pain medicines at the same time unless the doctor told you to. Many pain medicines have acetaminophen, which is Tylenol. Too much acetaminophen (Tylenol) can be harmful.     · If you think your pain medicine is making you sick to your stomach:  ? Take your medicine after meals (unless your doctor has told you not to). ?  Ask your doctor for a different pain medicine.     · If your doctor prescribed antibiotics, take them as directed. Do not stop taking them just because you feel better. You need to take the full course of antibiotics. Incision care    · Keep the area dry for 2 days. After 2 days, wash the area with soap and water every day, and always before dialysis.     · Do not soak or scrub the incision until it has healed.     · If you have a bandage, change it every day or as your doctor recommends. Your doctor will tell you when you can remove it. Exercise    · Squeeze a soft ball or other object as your doctor tells you. This will help blood flow through the access and help prevent blood clots.    Elevation    · Prop up the sore arm on a pillow anytime you sit or lie down during the next 3 days. Try to keep it above the level of your heart. This will help reduce swelling. Other instructions    · Every day, check your access for a pulse or thrill in the fistula or graft area. A thrill is a vibration. To feel a pulse or thrill, place the first two fingers of your hand over the access.     · Do not bump your arm.     · Do not wear tight clothing, jewelry, or anything else that may squeeze the access.     · Use your other arm to have blood drawn or blood pressure taken.     · Do not put cream or lotion on or near the access.     · Make sure all doctors you deal with know you have a vascular access. Follow-up care is a key part of your treatment and safety. Be sure to make and go to all appointments, and call your doctor if you are having problems. It's also a good idea to know your test results and keep a list of the medicines you take. When should you call for help? Call 911 anytime you think you may need emergency care.  For example, call if:    · You passed out (lost consciousness).     · You have chest pain, are short of breath, or cough up blood.    Call your doctor now or seek immediate medical care if:    · Your hand or arm is cold or dark-colored.     · You have no pulse in your access.     · You have nausea or you vomit.     · You have pain that does not get better after you take pain medicine.     · You have loose stitches, or your incision comes open.     · You are bleeding from the incision.     · You have signs of infection, such as:  ? Increased pain, swelling, warmth, or redness. ? Red streaks leading from the area. ? Pus draining from the area. ? A fever.     · You have signs of a blood clot in your leg (called a deep vein thrombosis), such as:  ? Pain in your calf, back of the knee, thigh, or groin. ? Redness or swelling in your leg.    Watch closely for changes in your health, and be sure to contact your doctor if you have any problems. Where can you learn more? Go to http://karen-catrachito.info/  Enter P616 in the search box to learn more about \"Hemodialysis Access: What to Expect at Home. \"  Current as of: August 11, 2019Content Version: 12.4  © 4169-3439 [employer]. Care instructions adapted under license by [employer] (which disclaims liability or warranty for this information). If you have questions about a medical condition or this instruction, always ask your healthcare professional. [address] any warranty or liability for your use of this information.

## 2020-04-15 NOTE — Clinical Note
Peripheral Lesion 1. Balloon inflated using single inflation technique. Lesion #1: Pressure = 10 jonel; Duration = 40 sec.

## 2020-04-15 NOTE — Clinical Note
TRANSFER - OUT REPORT:     Verbal report given to: rosario Vargas RN. Report consisted of patient's Situation, Background, Assessment and   Recommendations(SBAR). Opportunity for questions and clarification was provided. Patient transported with a Cardiac Cath Tech / Patient Care Tech. Patient transported to: [address].

## 2022-03-18 PROBLEM — R56.9 SEIZURE-LIKE ACTIVITY (HCC): Status: ACTIVE | Noted: 2020-02-04

## 2022-03-19 PROBLEM — R56.9 SEIZURE (HCC): Status: ACTIVE | Noted: 2020-02-04

## 2022-03-19 PROBLEM — E87.5 HYPERKALEMIA: Status: ACTIVE | Noted: 2020-02-04

## 2022-03-19 PROBLEM — N18.9 CKD (CHRONIC KIDNEY DISEASE): Status: ACTIVE | Noted: 2020-02-04

## 2023-02-03 ENCOUNTER — OFFICE VISIT (OUTPATIENT)
Dept: VASCULAR SURGERY | Age: 68
End: 2023-02-03
Payer: OTHER GOVERNMENT

## 2023-02-03 ENCOUNTER — DOCUMENTATION ONLY (OUTPATIENT)
Dept: VASCULAR SURGERY | Age: 68
End: 2023-02-03

## 2023-02-03 VITALS — BODY MASS INDEX: 18.42 KG/M2 | WEIGHT: 136 LBS | HEIGHT: 72 IN | OXYGEN SATURATION: 98 % | HEART RATE: 89 BPM

## 2023-02-03 DIAGNOSIS — Z99.2 ESRD (END STAGE RENAL DISEASE) ON DIALYSIS (HCC): ICD-10-CM

## 2023-02-03 DIAGNOSIS — N18.6 ESRD (END STAGE RENAL DISEASE) ON DIALYSIS (HCC): ICD-10-CM

## 2023-02-03 DIAGNOSIS — T82.858A: ICD-10-CM

## 2023-02-03 NOTE — PROGRESS NOTES
[facility]    Chief Complaint   Patient presents with    End Stage Renal Disease     Referred by Dialysis       History and Physical    Luis Fernando Baker Sr is a 69 y.o. male with PMH significant for ESRD on HD via LUE AVF, seizures, HTN, Sickle cell anemia. AICD/pacemaker on the right. he presents today for difficult fistula cannulation. The patient is seen in the presence of his wife. He states that he was initiated on dialysis about 10 days ago. He dialyzes [facility] at the [place] in [place]. His nephrologist is Dr. Barbara Johnson. Patient has a h/o R AVG which has thrombosed. He states it [employer]" but he describes episodes of coldness of his right hand, sounding consistent with possible steal syndrome. His left brachiobasilic AV fistula was created in 2018 by Dr. Anselmo Tolliver in anticipation of requiring dialysis. In 2020 he underwent a fistulagram which showed a mid arm basilic vein stenosis which was treated with 7x40mm balloon angioplasty. This is the access now being used for dialysis. Patient states that they're having difficulty cannulating the fistula. In fact, only one technician can successfully cannulate him. Despite this challenge, he states that he has had successful dialysis over the past five treatments.          Past Medical History:   Diagnosis Date    Chronic kidney disease     Hypertension     Sickle cell anemia (HCC)      Past Surgical History:   Procedure Laterality Date    HX ORTHOPAEDIC      [facility] INTRO CATH DIALYSIS CIRCUIT DX ANGRPH FLUOR S&I Left 4/15/2020    FISTULOGRAM LEFT performed by Leon Powell MD at [facility] CATH LAB    [facility] INTRO CATH DIALYSIS CIRCUIT W/TRLUML BALO ANGIOP N/A 4/15/2020    Angioplasty Fistula/Dialysis Circuit performed by Leon Powell MD at [facility] CATH LAB     Patient Active Problem List   Diagnosis Code    Hyperkalemia E87.5    CKD (chronic kidney disease) N18.9    Seizure-like activity ([address] R56.9    Seizure ([address] R56.9    ESRD (end stage renal disease) on dialysis ([facility] N18.6, Z99.2     Current Outpatient Medications   Medication Sig Dispense Refill    ferrous sulfate 325 mg (65 mg iron) tablet Take 325 mg by mouth two (2) times a day. carvediloL (COREG) 6.25 mg tablet Take 1 Tab by mouth two (2) times daily (with meals). 60 Tab 0    amLODIPine (NORVASC) 5 mg tablet Take 1 Tab by mouth daily. Indications: high blood pressure 30 Tab 0    furosemide (LASIX) 40 mg tablet Take 2 Tabs by mouth daily. 30 Tab 0    albuterol (PROVENTIL HFA, VENTOLIN HFA, PROAIR HFA) 90 mcg/actuation inhaler Take 1 Puff by inhalation every six (6) hours as needed for Shortness of Breath. ascorbic acid, vitamin C, (VITAMIN C) 500 mg tablet Take 500 mg by mouth daily. calcium acetate,phosphat bind, (PHOSLO) 667 mg cap Take 1 Cap by mouth three (3) times daily (with meals). pantoprazole (Protonix) 40 mg tablet Take 40 mg by mouth daily. traZODone (DESYREL) 50 mg tablet Take 50 mg by mouth nightly.      sodium bicarbonate 650 mg tablet Take 1 Tab by mouth three (3) times daily. Indications: excess body acid 90 Tab 0    calcium carbonate (OS-CORINA) 500 mg calcium (1,250 mg) tablet Take 1 Tab by mouth nightly.  30 Tab 0     Allergies   Allergen Reactions    Aspirin Other (comments)    Quinine Other (comments)    Sulfa (Sulfonamide Antibiotics) Other (comments)    Tylenol [Acetaminophen] Not Reported This Time     Social History     Socioeconomic History    Marital status:      Spouse name: Not on file    Number of children: Not on file    Years of education: Not on file    Highest education level: Not on file   Occupational History    Not on file   Tobacco Use    Smoking status: Every Day    Smokeless tobacco: Current   Substance and Sexual Activity    Alcohol use: No    Drug use: Yes     Types: Marijuana    Sexual activity: Not on file   Other Topics Concern    Not on file   Social History Narrative    Not on file     Social Determinants of Health Financial Resource Strain: Not on file   Food Insecurity: Not on file   Transportation Needs: Not on file   Physical Activity: Not on file   Stress: Not on file   Social Connections: Not on file   Intimate Partner Violence: Not on file   Housing Stability: Not on file      No family history on file. Physical Exam:    Visit Vitals  Pulse 89   Ht 6' (1.829 m)   Wt 136 lb (61.7 kg)   SpO2 98%   BMI 18.44 kg/m²        Constitutional:  Patient is well developed, well nourished, and not distressed. HEENT: atraumatic, normocephalic, wearing a mask. Eyes:   Cunjunctivae clear, no scleral icterus  Cardiovascular:  Normal rate, regular rhythm  Pulses:   Left:      Radial 2+     Left brachiobasilic AVF with thrill and bruit, increased pulsatility in the mid-segment. Evidence of infiltration overlying the bicep muscle. Pulmonary/Chest: Effort normal    Extremities: Normal range of motion. No edema. Digits no cyanosis or clubbing  Neurological:  he  is alert and oriented x3 . Gait normal. Motor & sensory grossly intact in all 4 limbs. Psych: Appropriate mood and affect. Skin:  Skin is warm and dry. No rash noted. No erythema. No ulcers. Impression and Plan:  Lucien Gunter is a 69 y.o. male with ESRD on HD, now with difficult cannulation of his left brachiobasilic AVF. Complication of dialysis access. Difficult cannulation and physical exam findings consistent with possible recurrence of mid arm basilic vein stenosis. Per the patient, he is currently able to get dialyzed and does not appear to require urgent placement of a tunneled catheter. - Will schedule him for fistulagram and possible intervention on 2/6/2022. The risks and benefits of this procedure were discussed. The patient voiced understanding. All questions were answered and the patient opted to proceed. - also discussed that if I am unable to get the fistula to work, will place a tunneled dialysis catheter.    - recommended warm compresses to the area of infiltration to help with resolution of ecchymosis      We reviewed the plan with the patient and his wife and they both express understanding. Michael Trevino MD    PLEASE NOTE:  This document has been produced using voice recognition software. Unrecognized errors in transcription may be present.

## 2023-02-03 NOTE — PROGRESS NOTES
Patient was here today to meet Dr. Kwabena Hill, patient is scheduled for a procedure on 02/06/2023 at 1:15pm with Dr. Kwabena Hill with a check in at time [facility] 11:[facility]am [facility], can take heart and blood pressure medication with a sip of water the morning of the procedure. Patient instructed to have someone drop off and  for the procedure. Patient confirmed and repeated instructions. Gave patient surgery instructions. Copy scanned in the system.

## 2023-02-06 ENCOUNTER — HOSPITAL ENCOUNTER (OUTPATIENT)
Age: 68
Setting detail: OUTPATIENT SURGERY
Discharge: HOME | End: 2023-02-06
Attending: SURGERY | Admitting: SURGERY
Payer: OTHER GOVERNMENT

## 2023-02-06 VITALS
OXYGEN SATURATION: 97 % | DIASTOLIC BLOOD PRESSURE: 91 MMHG | WEIGHT: 131 LBS | BODY MASS INDEX: 17.74 KG/M2 | HEART RATE: 72 BPM | RESPIRATION RATE: 14 BRPM | HEIGHT: 72 IN | SYSTOLIC BLOOD PRESSURE: 134 MMHG

## 2023-02-06 DIAGNOSIS — N18.6 ESRD (END STAGE RENAL DISEASE) ON DIALYSIS (HCC): Primary | ICD-10-CM

## 2023-02-06 DIAGNOSIS — Z99.2 ESRD (END STAGE RENAL DISEASE) ON DIALYSIS (HCC): Primary | ICD-10-CM

## 2023-02-06 DIAGNOSIS — T82.898A ARTERIOVENOUS FISTULA OCCLUSION, INITIAL ENCOUNTER (HCC): ICD-10-CM

## 2023-02-06 LAB
ANION GAP SERPL CALC-SCNC: 8 MMOL/L (ref 3–18)
BASOPHILS # BLD: 0 K/UL (ref 0–0.1)
BASOPHILS NFR BLD: 1 % (ref 0–2)
BUN SERPL-MCNC: 33 MG/DL (ref 7–18)
BUN/CREAT SERPL: 4 (ref 12–20)
CALCIUM SERPL-MCNC: 7.4 MG/DL (ref 8.5–10.1)
CHLORIDE SERPL-SCNC: 108 MMOL/L (ref 100–111)
CO2 SERPL-SCNC: 24 MMOL/L (ref 21–32)
CREAT SERPL-MCNC: 9.22 MG/DL (ref 0.6–1.3)
DIFFERENTIAL METHOD BLD: ABNORMAL
EOSINOPHIL # BLD: 0.1 K/UL (ref 0–0.4)
EOSINOPHIL NFR BLD: 2 % (ref 0–5)
ERYTHROCYTE [DISTWIDTH] IN BLOOD BY AUTOMATED COUNT: 12.6 % (ref 11.6–14.5)
GLUCOSE SERPL-MCNC: 91 MG/DL (ref 74–99)
HCT VFR BLD AUTO: 31.4 % (ref 36–48)
HGB BLD-MCNC: 10.2 G/DL (ref 13–16)
IMM GRANULOCYTES # BLD AUTO: 0 K/UL (ref 0–0.04)
IMM GRANULOCYTES NFR BLD AUTO: 0 % (ref 0–0.5)
INR PPP: 1.1 (ref 0.8–1.2)
LYMPHOCYTES # BLD: 0.9 K/UL (ref 0.9–3.6)
LYMPHOCYTES NFR BLD: 22 % (ref 21–52)
MCH RBC QN AUTO: 27.9 PG (ref 24–34)
MCHC RBC AUTO-ENTMCNC: 32.5 G/DL (ref 31–37)
MCV RBC AUTO: 85.8 FL (ref 78–100)
MONOCYTES # BLD: 0.5 K/UL (ref 0.05–1.2)
MONOCYTES NFR BLD: 12 % (ref 3–10)
NEUTS SEG # BLD: 2.6 K/UL (ref 1.8–8)
NEUTS SEG NFR BLD: 63 % (ref 40–73)
NRBC # BLD: 0 K/UL (ref 0–0.01)
NRBC BLD-RTO: 0 PER 100 WBC
PLATELET # BLD AUTO: 181 K/UL (ref 135–420)
PMV BLD AUTO: 9.6 FL (ref 9.2–11.8)
POTASSIUM SERPL-SCNC: 3.9 MMOL/L (ref 3.5–5.5)
PROTHROMBIN TIME: 14.3 SEC (ref 11.5–15.2)
RBC # BLD AUTO: 3.66 M/UL (ref 4.35–5.65)
SODIUM SERPL-SCNC: 140 MMOL/L (ref 136–145)
WBC # BLD AUTO: 4.1 K/UL (ref 4.6–13.2)

## 2023-02-06 PROCEDURE — 85025 COMPLETE CBC W/AUTO DIFF WBC: CPT

## 2023-02-06 PROCEDURE — 80048 BASIC METABOLIC PNL TOTAL CA: CPT

## 2023-02-06 PROCEDURE — 99153 MOD SED SAME PHYS/QHP EA: CPT | Performed by: SURGERY

## 2023-02-06 PROCEDURE — 74011250636 HC RX REV CODE- 250/636: Performed by: SURGERY

## 2023-02-06 PROCEDURE — C1725 CATH, TRANSLUMIN NON-LASER: HCPCS | Performed by: SURGERY

## 2023-02-06 PROCEDURE — C1769 GUIDE WIRE: HCPCS | Performed by: SURGERY

## 2023-02-06 PROCEDURE — 77030013744: Performed by: SURGERY

## 2023-02-06 PROCEDURE — 74011000636 HC RX REV CODE- 636: Performed by: SURGERY

## 2023-02-06 PROCEDURE — 36902 INTRO CATH DIALYSIS CIRCUIT: CPT | Performed by: SURGERY

## 2023-02-06 PROCEDURE — 77030013519 HC DEV INFL BASIX MRTM -B: Performed by: SURGERY

## 2023-02-06 PROCEDURE — C1894 INTRO/SHEATH, NON-LASER: HCPCS | Performed by: SURGERY

## 2023-02-06 PROCEDURE — 99152 MOD SED SAME PHYS/QHP 5/>YRS: CPT | Performed by: SURGERY

## 2023-02-06 PROCEDURE — 77030002933 HC SUT MCRYL J&J -A: Performed by: SURGERY

## 2023-02-06 PROCEDURE — 85610 PROTHROMBIN TIME: CPT

## 2023-02-06 PROCEDURE — 74011000250 HC RX REV CODE- 250: Performed by: SURGERY

## 2023-02-06 PROCEDURE — 2709999900 HC NON-CHARGEABLE SUPPLY: Performed by: SURGERY

## 2023-02-06 RX ORDER — LIDOCAINE HYDROCHLORIDE 10 MG/ML
INJECTION, SOLUTION EPIDURAL; INFILTRATION; INTRACAUDAL; PERINEURAL AS NEEDED
Status: DISCONTINUED | OUTPATIENT
Start: 2023-02-06 | End: 2023-02-06 | Stop reason: HOSPADM

## 2023-02-06 RX ORDER — MIDAZOLAM HYDROCHLORIDE 1 MG/ML
INJECTION, SOLUTION INTRAMUSCULAR; INTRAVENOUS AS NEEDED
Status: DISCONTINUED | OUTPATIENT
Start: 2023-02-06 | End: 2023-02-06 | Stop reason: HOSPADM

## 2023-02-06 RX ORDER — FENTANYL CITRATE 50 UG/ML
INJECTION, SOLUTION INTRAMUSCULAR; INTRAVENOUS AS NEEDED
Status: DISCONTINUED | OUTPATIENT
Start: 2023-02-06 | End: 2023-02-06 | Stop reason: HOSPADM

## 2023-02-06 NOTE — OP NOTES
OPERATIVE NOTE     Left upper extremity FISTULAGRAM with intervention      Date of Service: 2/6/2023    Surgeon: Elizabeth Burton MD PhD    Assistant: Keith Armstrong RTToiR    Preoperative Dx: Left brachiobasilic AV with difficult cannulation    Postoperative Dx: Left brachiobasilic AVF with venous stenosis    Procedure:   Left upper extremity fistulagram  Balloon angioplasty of mid arm basilic long-segment stenosis with 7x4 and 6x4 Conquest angioplasty balloon  Supervision and interpretation of all radiographic imaging    Anesthesia: Local anesthetic with 1% lidocaine. Moderate sedation was administered by a qualified  under my direct supervision. 1 mg of versed and 50 mcg of fentanyl were administered. Sedation start time: 1353, sedation end time: 1418    Findings:   Long segment stenosis of the mid arm basilic vein, encompassing the cannulation zone. The arterial anastomosis, JA segment, upper arm basilic vein, axillary vein, subclavian, brachiocephalic vein and SVC are widely patent. Implants: none    Disposition: To recovery in stable condition      Indication: This is a 69 y.o. male with ESRD on HD via left brachiobasilic AVF. He presented to the office with complaints of difficult cannulation. he now presents for fistulagram. The risks and benefits of this procedure were discussed. The patient voiced understanding. All questions were answered and the patient opted to proceed. Description of Procedure:   After informed consent was obtained, the patient was transported to the cath lab and placed on the angio table in supine position. The left arm was positioned in abduction and prepped and draped in standard sterile fashion. A safety time out was performed with all pertinent personnel and equipment present. Moderate sedation was administered. 1% lidocaine was infiltrated overlying the venous portion of the access which was then accessed with a micropuncture system in retrograde fashion.  A fistulagram was performed through the micropuncture sheath with above findings. The decision was made to intervene. A stiff glide wire was advanced and the micropuncture sheath exchanged for a short 7fr short sheath. The wire was advanced into the radial artery. An 7x4mm angioplasty balloon was introduced and advanced across the stenosis. The balloon was inflated to nominal pressure,deflated and removed. A repeat fistulagram showed contrast extravasation. Immediate external pressure was applied. An 6x4 Conquest angioplasty balloon was introduced and advanced across the stenosis and the site of extravasation. The balloon was inflated to nominal pressure for 1 minute,deflated and removed. A repeat fistulagram showed resolution of extravasation and about 20% residual stenosis. The wire was removed. Access site was closed with 4-0 monocryl after the sheath was removed. There was a palpable thrill in the fistula at conclusion of the procedure. The patient tolerated the procedure well. He was transported to the recovery area in stable condition.        Elan Walton MD PhD  Vascular Surgery

## 2023-02-06 NOTE — Clinical Note
Sheath #1: sheath exchanged for INTRODUCER [facility] 7FR NIKOS L5.5CM DIL TIP 35MM ORNG TUNGSTEN.

## 2023-02-06 NOTE — DISCHARGE INSTRUCTIONS
Ok to access AVF for dialysis. - please refrain from strenuous activity for 24h, including lifting, pushing or pulling anything >5lb. - please call our office if you continue to have difficulty with your dialysis access      DISCHARGE SUMMARY from Nurse    PATIENT INSTRUCTIONS:    After general anesthesia or intravenous sedation, for 24 hours or while taking prescription Narcotics:  Limit your activities  Do not drive and operate hazardous machinery  Do not make important personal or business decisions  Do  not drink alcoholic beverages  If you have not urinated within 8 hours after discharge, please contact your surgeon on call. Report the following to your surgeon:  Excessive pain, swelling, redness or odor of or around the surgical area  Temperature over 100.5  Nausea and vomiting lasting longer than 4 hours or if unable to take medications  Any signs of decreased circulation or nerve impairment to extremity: change in color, persistent  numbness, tingling, coldness or increase pain  Any questions    What to do at Home:  Recommended activity: Activity as tolerated and no driving for today    If you experience any of the following symptoms such as bleeding, please follow up with your vascular doctor's office. *  Please give a list of your current medications to your Primary Care Provider. *  Please update this list whenever your medications are discontinued, doses are      changed, or new medications (including over-the-counter products) are added. *  Please carry medication information at all times in case of emergency situations. These are general instructions for a healthy lifestyle:    No smoking/ No tobacco products/ Avoid exposure to second hand smoke  Surgeon General's Warning:  Quitting smoking now greatly reduces serious risk to your health.     Obesity, smoking, and sedentary lifestyle greatly increases your risk for illness    A healthy diet, regular physical exercise & weight monitoring are important for maintaining a healthy lifestyle    You may be retaining fluid if you have a history of heart failure or if you experience any of the following symptoms:  Weight gain of 3 pounds or more overnight or 5 pounds in a week, increased swelling in our hands or feet or shortness of breath while lying flat in bed. Please call your doctor as soon as you notice any of these symptoms; do not wait until your next office visit. The discharge information has been reviewed with the patient. The patient verbalized understanding. Discharge medications reviewed with the patient and appropriate educational materials and side effects teaching were provided.   ___________________________________________________________________________________________________________________________________

## 2023-02-06 NOTE — Clinical Note
Patient transported with a Cardiac Cath Tech / Patient Care Tech. Patient transported to: holding area.

## 2023-02-06 NOTE — ROUTINE PROCESS
Cath holding summary     Patient escorted to cath holding from waiting area ambulatory, alert and oriented x 4, voicing no complaints. Changed into gown and placed on monitor. NPO since MN. Lab results, med rec and H&P reviewed on chart. PIV x 1 inserted without difficulty. Family to bedside. 1335  TRANSFER - OUT REPORT:    Verbal report given to Lola Modi RN(name) on [facility]  being transferred to Cath Lab (unit) for ordered procedure     Report consisted of patients Situation, Background, Assessment and   Recommendations(SBAR). Information from the following report(s) SBAR, Intake/Output, MAR, Recent Results, and Pre Procedure Checklist was reviewed with the receiving nurse. Lines:   22g to R FA  Opportunity for questions and clarification was provided. Patient transported with:   Registered Nurse      [address] [address] REPORT:    Verbal report received from [address] [facility] (name) on [facility]  being received from Cath lab (unit) for ordered procedure    Report consisted of patients Situation, Background, Assessment and   Recommendations(SBAR). Information from the following report(s) SBAR, Procedure Summary, Intake/Output, MAR, and Recent Results was reviewed with the receiving nurse. Opportunity for questions and clarification was provided. Assessment completed upon patients arrival to unit and care assumed. Dressing applied during procedure to right arm. CDI and patient is in NAD at this time. VSS. 0  AVS Discharge instructions reviewed with patient and copy given to patient. All questions answered. Patient verbalized understanding to all discharge instructions. PIV removed. Procedural site within normal limits. No hematoma or bleeding noted from procedural and PIV site. No pain noted at discharge. Patient discharged with support person in stable condition. Escorted out to vehicle for transport home.

## 2023-02-06 NOTE — Clinical Note
TRANSFER - IN REPORT:     Verbal report received from: Omar Fortune RN. Report consisted of patient's Situation, Background, Assessment and   Recommendations(SBAR). Opportunity for questions and clarification was provided. Assessment completed upon patient's arrival to unit and care assumed. Patient transported with a Registered Nurse.

## 2023-03-21 ENCOUNTER — TELEPHONE (OUTPATIENT)
Age: 68
End: 2023-03-21

## 2023-03-21 NOTE — TELEPHONE ENCOUNTER
Patient no showed for his DC appointment with Dr. Rayne Patrick on 03/08/23. Called and spoke to his spouse to schedule a follow up. She said she will call back to schedule.  Schedule next available on non dialysis day with Dr. Rayne Patrick for DC on 02.21.23 surgery for LUE fistulogram.

## (undated) DEVICE — KIT MIC INTR PERC 4F 60CM SMTH -- VSI

## (undated) DEVICE — SUT ETHLN 3-0 18IN PC5 BLK --

## (undated) DEVICE — PACK PROCEDURE SURG VASC CATH 161 MMC LF

## (undated) DEVICE — INTRODUCER SHTH 4FR CANN L11CM DIL TIP 25MM RED TUNGSTEN

## (undated) DEVICE — PTA BALLOON DILATATION CATHETER: Brand: STERLING™

## (undated) DEVICE — SUTURE MCRYL SZ 4-0 L18IN ABSRB UD L19MM PS-2 3/8 CIR PRIM Y496G

## (undated) DEVICE — COVER US PRB W15XL120CM W/ GEL RUBBERBAND TAPE STRP FLD GEN

## (undated) DEVICE — INTRODUCER SHTH 7FR CANN L5.5CM DIL TIP 35MM ORNG TUNGSTEN

## (undated) DEVICE — PTA BALLOON DILATATION CATHETER: Brand: CHARGER™

## (undated) DEVICE — Device

## (undated) DEVICE — DEVICE INFL L13IN 40ATM 30ML BASIXTOUCH

## (undated) DEVICE — SET FLD ADMIN 3 W STPCOCK FIX FEM L BOR 1IN

## (undated) DEVICE — GUIDEWIRE WITH ICE™ HYDROPHILIC COATING: Brand: V-18™ CONTROL WIRE™

## (undated) DEVICE — ANGIOGRAPHY KIT CUST VASC

## (undated) DEVICE — RADIFOCUS GLIDEWIRE: Brand: GLIDEWIRE

## (undated) DEVICE — CONQUEST® 40 PTA DILATATION CATHETER 6 MM X 40 MM, 75 CM CATHETER: Brand: CONQUEST® 40